# Patient Record
Sex: FEMALE | Race: OTHER | HISPANIC OR LATINO
[De-identification: names, ages, dates, MRNs, and addresses within clinical notes are randomized per-mention and may not be internally consistent; named-entity substitution may affect disease eponyms.]

---

## 2017-08-10 ENCOUNTER — NON-APPOINTMENT (OUTPATIENT)
Age: 67
End: 2017-08-10

## 2017-08-10 ENCOUNTER — APPOINTMENT (OUTPATIENT)
Dept: CARDIOLOGY | Facility: CLINIC | Age: 67
End: 2017-08-10
Payer: MEDICARE

## 2017-08-10 VITALS
DIASTOLIC BLOOD PRESSURE: 70 MMHG | HEIGHT: 64 IN | OXYGEN SATURATION: 96 % | RESPIRATION RATE: 18 BRPM | SYSTOLIC BLOOD PRESSURE: 113 MMHG | HEART RATE: 81 BPM | BODY MASS INDEX: 31.58 KG/M2 | WEIGHT: 185 LBS

## 2017-08-10 VITALS — SYSTOLIC BLOOD PRESSURE: 113 MMHG | DIASTOLIC BLOOD PRESSURE: 71 MMHG

## 2017-08-10 DIAGNOSIS — Z82.49 FAMILY HISTORY OF ISCHEMIC HEART DISEASE AND OTHER DISEASES OF THE CIRCULATORY SYSTEM: ICD-10-CM

## 2017-08-10 DIAGNOSIS — Z60.2 PROBLEMS RELATED TO LIVING ALONE: ICD-10-CM

## 2017-08-10 DIAGNOSIS — I10 ESSENTIAL (PRIMARY) HYPERTENSION: ICD-10-CM

## 2017-08-10 DIAGNOSIS — Z83.3 FAMILY HISTORY OF DIABETES MELLITUS: ICD-10-CM

## 2017-08-10 DIAGNOSIS — Z86.39 PERSONAL HISTORY OF OTHER ENDOCRINE, NUTRITIONAL AND METABOLIC DISEASE: ICD-10-CM

## 2017-08-10 PROCEDURE — 99204 OFFICE O/P NEW MOD 45 MIN: CPT

## 2017-08-10 PROCEDURE — 93000 ELECTROCARDIOGRAM COMPLETE: CPT

## 2017-08-10 SDOH — SOCIAL STABILITY - SOCIAL INSECURITY: PROBLEMS RELATED TO LIVING ALONE: Z60.2

## 2017-08-17 ENCOUNTER — APPOINTMENT (OUTPATIENT)
Dept: CARDIOLOGY | Facility: CLINIC | Age: 67
End: 2017-08-17

## 2017-08-31 ENCOUNTER — EMERGENCY (EMERGENCY)
Facility: HOSPITAL | Age: 67
LOS: 1 days | Discharge: HOME | End: 2017-08-31
Attending: EMERGENCY MEDICINE
Payer: MEDICARE

## 2017-08-31 VITALS
RESPIRATION RATE: 18 BRPM | WEIGHT: 184.97 LBS | HEART RATE: 73 BPM | DIASTOLIC BLOOD PRESSURE: 74 MMHG | OXYGEN SATURATION: 99 % | HEIGHT: 64 IN | TEMPERATURE: 99 F | SYSTOLIC BLOOD PRESSURE: 123 MMHG

## 2017-08-31 LAB
APPEARANCE UR: CLEAR — SIGNIFICANT CHANGE UP
BILIRUB UR-MCNC: NEGATIVE — SIGNIFICANT CHANGE UP
COLOR SPEC: YELLOW — SIGNIFICANT CHANGE UP
DIFF PNL FLD: NEGATIVE — SIGNIFICANT CHANGE UP
EPI CELLS # UR: SIGNIFICANT CHANGE UP
GLUCOSE UR QL: NEGATIVE MG/DL — SIGNIFICANT CHANGE UP
KETONES UR-MCNC: NEGATIVE — SIGNIFICANT CHANGE UP
LEUKOCYTE ESTERASE UR-ACNC: ABNORMAL
NITRITE UR-MCNC: NEGATIVE — SIGNIFICANT CHANGE UP
PH UR: 5 — SIGNIFICANT CHANGE UP (ref 5–8)
PROT UR-MCNC: NEGATIVE MG/DL — SIGNIFICANT CHANGE UP
SP GR SPEC: 1 — LOW (ref 1.01–1.02)
UROBILINOGEN FLD QL: NEGATIVE MG/DL — SIGNIFICANT CHANGE UP
WBC UR QL: NEGATIVE — SIGNIFICANT CHANGE UP

## 2017-08-31 PROCEDURE — 96372 THER/PROPH/DIAG INJ SC/IM: CPT

## 2017-08-31 PROCEDURE — 72131 CT LUMBAR SPINE W/O DYE: CPT

## 2017-08-31 PROCEDURE — 96375 TX/PRO/DX INJ NEW DRUG ADDON: CPT

## 2017-08-31 PROCEDURE — T1013: CPT

## 2017-08-31 PROCEDURE — 99284 EMERGENCY DEPT VISIT MOD MDM: CPT

## 2017-08-31 PROCEDURE — 96374 THER/PROPH/DIAG INJ IV PUSH: CPT

## 2017-08-31 PROCEDURE — 72131 CT LUMBAR SPINE W/O DYE: CPT | Mod: 26

## 2017-08-31 PROCEDURE — 81001 URINALYSIS AUTO W/SCOPE: CPT

## 2017-08-31 PROCEDURE — 99284 EMERGENCY DEPT VISIT MOD MDM: CPT | Mod: 25

## 2017-08-31 RX ORDER — KETOROLAC TROMETHAMINE 30 MG/ML
30 SYRINGE (ML) INJECTION ONCE
Qty: 0 | Refills: 0 | Status: DISCONTINUED | OUTPATIENT
Start: 2017-08-31 | End: 2017-08-31

## 2017-08-31 RX ORDER — METHOCARBAMOL 500 MG/1
2 TABLET, FILM COATED ORAL
Qty: 30 | Refills: 0 | OUTPATIENT
Start: 2017-08-31 | End: 2017-09-05

## 2017-08-31 RX ORDER — IBUPROFEN 200 MG
1 TABLET ORAL
Qty: 20 | Refills: 0 | OUTPATIENT
Start: 2017-08-31 | End: 2017-09-05

## 2017-08-31 RX ORDER — METHOCARBAMOL 500 MG/1
1000 TABLET, FILM COATED ORAL ONCE
Qty: 0 | Refills: 0 | Status: COMPLETED | OUTPATIENT
Start: 2017-08-31 | End: 2017-08-31

## 2017-08-31 RX ADMIN — METHOCARBAMOL 1000 MILLIGRAM(S): 500 TABLET, FILM COATED ORAL at 09:54

## 2017-08-31 RX ADMIN — Medication 30 MILLIGRAM(S): at 09:54

## 2017-08-31 NOTE — ED STATDOCS - MEDICAL DECISION MAKING DETAILS
Back pain w/ no localizing neurovascular abnormalities and no historical red flags. CT to eval for pathologic fracture Back pain w/ no localizing neurovascular abnormalities and no historical red flags. CT to eval for pathologic fracture.

## 2017-08-31 NOTE — ED STATDOCS - ATTENDING CONTRIBUTION TO CARE
I, Jr Lowe, performed the initial face to face bedside interview with this patient regarding history of present illness, review of symptoms and relevant past medical, social and family history.  I completed an independent physical examination.  I was the provider who initially evaluated this patient.  The history, relevant review of systems, past medical and surgical history, medical decision making, and physical examination was documented by the scribe in my presence and I attest to the accuracy of the documentation. Follow-up on ordered tests (ie labs, radiologic studies) and re-evaluation of the patient's status has been communicated to the ACP.  Disposition of the patient will be based on test outcome and response to ED interventions.

## 2017-08-31 NOTE — ED STATDOCS - NEUROLOGICAL, MLM
L4-S1 strength is 4/5+. weakness with evulsion of the foot on the L. L4-S1 strength is 5/5 on right.  mild weakness with eversion of the foot on the L, normal plantar and dorsiflexion

## 2017-08-31 NOTE — ED STATDOCS - PROGRESS NOTE DETAILS
66 yo F pt complaining of lower back pain radiating down right leg x 3 weeks. Seen by intake MD: H&P, orders, reviewed. PT admits to having similar pain 1 year ago, pain with R hip flexion. PT denies trauma, chest pain, SOB, LOC. PE: Heart: s1s2 Chest: fair aeration throughout b/l lung fields, no w/r/r. Abdomen: soft NT, nondistended, - CVAT MS: ROM intact, N/V intact, b/l UE/LE 4+ strength, non tender to palpation over vertebral bodies. 66 yo F pt complaining of lower back pain radiating down right leg x 3 weeks. Seen by intake MD: H&P, orders, reviewed. PT admits to having similar pain 1 year ago, pain with R hip flexion. PT denies trauma, chest pain, SOB, LOC, incontinence, numbness/tingling . PE: Heart: s1s2 Chest: fair aeration throughout b/l lung fields, no w/r/r. Abdomen: soft NT, nondistended, - CVAT MS: ROM intact, N/V intact, b/l UE/LE 4+ strength, non tender to palpation over vertebral bodies. A/P: R/O pathological FX, lumbar CT CT results "L5/S1 spondylosis". PT to follow up with ortho spine.

## 2017-08-31 NOTE — ED STATDOCS - CARE PLAN
Principal Discharge DX:	Bilateral low back pain, unspecified chronicity, with sciatica presence unspecified

## 2017-08-31 NOTE — ED STATDOCS - OBJECTIVE STATEMENT
68 y/o with hx of depression, HTN, HLD presents to ED c/o lower back pain radiating to the R leg onset 3 weeks, worsening today. She has seen her PMD for the pain and was given an injection with no relief; she was send for a CT. Pt reports the pain has worsened. Pt has an appointment with the orthopedic for september 7th. No prior hx of back pain. Pain is exacerbated with positional changes. No incontinence, hematuria, numbness, tingling. Language Services was utilized in obtaining the H & P and throughout the duration of the patient's care. No further complaints at this time. 68 y/o with hx of depression, HTN, HLD, cataract surgery presents to ED c/o lower back pain radiating to the R leg onset 3 weeks, worsening today. She has seen her PMD for the pain and was given an injection with no relief; she was send for a CT by her PMD but has not had one done yet. Pt has an appointment with the orthopedic for september 7th but states she cannot bear the pain until then. No prior hx of back pain. Denies recent fall, injury, trauma, heavy lifting. Pain is exacerbated with positional changes. No incontinence, hematuria, numbness, tingling. Language [employer]  [place] was utilized in obtaining the H & P and throughout the duration of the patient's care. No further complaints at this time. 68 y/o with hx of depression, HTN, HLD, cataract surgery presents to ED c/o lower back pain radiating to the left buttock and  leg for the past 3 weeks, worsening today. She has seen her PMD for the pain and was given an injection with no relief; advised to have MRI but has not had one done yet. Pt has an appointment with the orthopedic for september 7th but states she cannot bear the pain until then. No prior hx of back pain. Denies recent fall, injury, trauma, heavy lifting. Pain is exacerbated with positional changes. No fecal incontinence, hematuria, numbness, tingling. No h/o cancer: normal mammo and colonoscopy in recent past.  Language [employer]  [place] was utilized in obtaining the H & P and throughout the duration of the patient's care. No further complaints at this time.

## 2017-08-31 NOTE — ED STATDOCS - MUSCULOSKELETAL, MLM
No pedal edema, calf tenderness.  Left para lumbar tenderness. No midline tenderness. No pedal edema, calf tenderness. Left para lumbar tenderness. No midline tenderness.

## 2017-09-07 ENCOUNTER — APPOINTMENT (OUTPATIENT)
Dept: MRI IMAGING | Facility: CLINIC | Age: 67
End: 2017-09-07
Payer: MEDICARE

## 2017-09-07 ENCOUNTER — OUTPATIENT (OUTPATIENT)
Dept: OUTPATIENT SERVICES | Facility: HOSPITAL | Age: 67
LOS: 1 days | End: 2017-09-07

## 2017-09-07 DIAGNOSIS — Z00.8 ENCOUNTER FOR OTHER GENERAL EXAMINATION: ICD-10-CM

## 2017-09-07 PROCEDURE — 72148 MRI LUMBAR SPINE W/O DYE: CPT | Mod: 26

## 2017-09-13 ENCOUNTER — APPOINTMENT (OUTPATIENT)
Dept: NEUROLOGY | Facility: CLINIC | Age: 67
End: 2017-09-13

## 2017-09-14 ENCOUNTER — APPOINTMENT (OUTPATIENT)
Dept: CARDIOLOGY | Facility: CLINIC | Age: 67
End: 2017-09-14
Payer: MEDICARE

## 2017-09-14 PROCEDURE — 93306 TTE W/DOPPLER COMPLETE: CPT

## 2017-09-19 ENCOUNTER — APPOINTMENT (OUTPATIENT)
Dept: CARDIOLOGY | Facility: CLINIC | Age: 67
End: 2017-09-19
Payer: MEDICARE

## 2017-09-19 ENCOUNTER — NON-APPOINTMENT (OUTPATIENT)
Age: 67
End: 2017-09-19

## 2017-09-19 VITALS
SYSTOLIC BLOOD PRESSURE: 117 MMHG | BODY MASS INDEX: 31.58 KG/M2 | HEIGHT: 64 IN | OXYGEN SATURATION: 96 % | HEART RATE: 80 BPM | DIASTOLIC BLOOD PRESSURE: 77 MMHG | WEIGHT: 185 LBS

## 2017-09-19 PROCEDURE — 93000 ELECTROCARDIOGRAM COMPLETE: CPT

## 2017-09-19 PROCEDURE — 99214 OFFICE O/P EST MOD 30 MIN: CPT

## 2017-09-26 ENCOUNTER — APPOINTMENT (OUTPATIENT)
Dept: GASTROENTEROLOGY | Facility: CLINIC | Age: 67
End: 2017-09-26

## 2017-10-02 ENCOUNTER — OUTPATIENT (OUTPATIENT)
Dept: OUTPATIENT SERVICES | Facility: HOSPITAL | Age: 67
LOS: 1 days | End: 2017-10-02
Payer: MEDICARE

## 2017-10-02 ENCOUNTER — APPOINTMENT (OUTPATIENT)
Dept: MRI IMAGING | Facility: CLINIC | Age: 67
End: 2017-10-02
Payer: MEDICARE

## 2017-10-02 DIAGNOSIS — Z00.8 ENCOUNTER FOR OTHER GENERAL EXAMINATION: ICD-10-CM

## 2017-10-02 PROCEDURE — 72141 MRI NECK SPINE W/O DYE: CPT | Mod: 26

## 2017-10-02 PROCEDURE — 72141 MRI NECK SPINE W/O DYE: CPT

## 2017-10-04 ENCOUNTER — APPOINTMENT (OUTPATIENT)
Dept: NEUROLOGY | Facility: CLINIC | Age: 67
End: 2017-10-04
Payer: MEDICARE

## 2017-10-04 VITALS
WEIGHT: 185 LBS | HEIGHT: 65 IN | DIASTOLIC BLOOD PRESSURE: 85 MMHG | BODY MASS INDEX: 30.82 KG/M2 | SYSTOLIC BLOOD PRESSURE: 135 MMHG

## 2017-10-04 PROCEDURE — 99203 OFFICE O/P NEW LOW 30 MIN: CPT

## 2017-10-05 ENCOUNTER — FORM ENCOUNTER (OUTPATIENT)
Age: 67
End: 2017-10-05

## 2017-10-05 ENCOUNTER — APPOINTMENT (OUTPATIENT)
Dept: CARDIOLOGY | Facility: CLINIC | Age: 67
End: 2017-10-05
Payer: MEDICARE

## 2017-10-05 PROCEDURE — 93880 EXTRACRANIAL BILAT STUDY: CPT

## 2017-10-06 ENCOUNTER — APPOINTMENT (OUTPATIENT)
Dept: MRI IMAGING | Facility: CLINIC | Age: 67
End: 2017-10-06
Payer: MEDICARE

## 2017-10-06 ENCOUNTER — OUTPATIENT (OUTPATIENT)
Dept: OUTPATIENT SERVICES | Facility: HOSPITAL | Age: 67
LOS: 1 days | End: 2017-10-06
Payer: MEDICARE

## 2017-10-06 DIAGNOSIS — G44.89 OTHER HEADACHE SYNDROME: ICD-10-CM

## 2017-10-06 PROCEDURE — 70551 MRI BRAIN STEM W/O DYE: CPT | Mod: 26

## 2017-10-06 PROCEDURE — 70551 MRI BRAIN STEM W/O DYE: CPT

## 2017-11-02 ENCOUNTER — APPOINTMENT (OUTPATIENT)
Dept: NEUROLOGY | Facility: CLINIC | Age: 67
End: 2017-11-02
Payer: MEDICARE

## 2017-11-02 VITALS
SYSTOLIC BLOOD PRESSURE: 140 MMHG | HEIGHT: 65 IN | DIASTOLIC BLOOD PRESSURE: 81 MMHG | WEIGHT: 185 LBS | BODY MASS INDEX: 30.82 KG/M2

## 2017-11-02 PROCEDURE — 70551 MRI BRAIN STEM W/O DYE: CPT | Mod: 26

## 2017-11-02 PROCEDURE — 99213 OFFICE O/P EST LOW 20 MIN: CPT

## 2017-12-19 ENCOUNTER — APPOINTMENT (OUTPATIENT)
Dept: MRI IMAGING | Facility: CLINIC | Age: 67
End: 2017-12-19
Payer: MEDICARE

## 2017-12-19 ENCOUNTER — OUTPATIENT (OUTPATIENT)
Dept: OUTPATIENT SERVICES | Facility: HOSPITAL | Age: 67
LOS: 1 days | End: 2017-12-19
Payer: MEDICARE

## 2017-12-19 ENCOUNTER — APPOINTMENT (OUTPATIENT)
Dept: MAMMOGRAPHY | Facility: CLINIC | Age: 67
End: 2017-12-19
Payer: MEDICARE

## 2017-12-19 DIAGNOSIS — M25.552 PAIN IN LEFT HIP: ICD-10-CM

## 2017-12-19 PROCEDURE — G0202: CPT | Mod: 26

## 2017-12-19 PROCEDURE — 77067 SCR MAMMO BI INCL CAD: CPT

## 2017-12-19 PROCEDURE — 73721 MRI JNT OF LWR EXTRE W/O DYE: CPT | Mod: 26,LT

## 2017-12-19 PROCEDURE — 77063 BREAST TOMOSYNTHESIS BI: CPT | Mod: 26

## 2017-12-19 PROCEDURE — 73721 MRI JNT OF LWR EXTRE W/O DYE: CPT

## 2017-12-19 PROCEDURE — 77063 BREAST TOMOSYNTHESIS BI: CPT

## 2017-12-26 DIAGNOSIS — Z12.31 ENCOUNTER FOR SCREENING MAMMOGRAM FOR MALIGNANT NEOPLASM OF BREAST: ICD-10-CM

## 2017-12-26 DIAGNOSIS — S73.192A OTHER SPRAIN OF LEFT HIP, INITIAL ENCOUNTER: ICD-10-CM

## 2018-01-02 ENCOUNTER — APPOINTMENT (OUTPATIENT)
Dept: GASTROENTEROLOGY | Facility: CLINIC | Age: 68
End: 2018-01-02
Payer: MEDICARE

## 2018-01-02 VITALS — BODY MASS INDEX: 30.99 KG/M2 | HEIGHT: 65 IN | WEIGHT: 186 LBS

## 2018-01-02 VITALS — HEART RATE: 79 BPM | DIASTOLIC BLOOD PRESSURE: 94 MMHG | SYSTOLIC BLOOD PRESSURE: 140 MMHG

## 2018-01-02 PROCEDURE — 82270 OCCULT BLOOD FECES: CPT

## 2018-01-02 PROCEDURE — 99204 OFFICE O/P NEW MOD 45 MIN: CPT

## 2018-02-15 ENCOUNTER — APPOINTMENT (OUTPATIENT)
Dept: MRI IMAGING | Facility: CLINIC | Age: 68
End: 2018-02-15
Payer: MEDICARE

## 2018-02-15 ENCOUNTER — OUTPATIENT (OUTPATIENT)
Dept: OUTPATIENT SERVICES | Facility: HOSPITAL | Age: 68
LOS: 1 days | End: 2018-02-15
Payer: MEDICARE

## 2018-02-15 DIAGNOSIS — M54.16 RADICULOPATHY, LUMBAR REGION: ICD-10-CM

## 2018-02-15 PROCEDURE — 72148 MRI LUMBAR SPINE W/O DYE: CPT

## 2018-02-15 PROCEDURE — 72148 MRI LUMBAR SPINE W/O DYE: CPT | Mod: 26

## 2018-03-05 ENCOUNTER — RESULT REVIEW (OUTPATIENT)
Age: 68
End: 2018-03-05

## 2018-03-05 ENCOUNTER — APPOINTMENT (OUTPATIENT)
Dept: GASTROENTEROLOGY | Facility: GI CENTER | Age: 68
End: 2018-03-05
Payer: MEDICARE

## 2018-03-05 ENCOUNTER — OUTPATIENT (OUTPATIENT)
Dept: OUTPATIENT SERVICES | Facility: HOSPITAL | Age: 68
LOS: 1 days | End: 2018-03-05
Payer: MEDICARE

## 2018-03-05 VITALS
DIASTOLIC BLOOD PRESSURE: 90 MMHG | BODY MASS INDEX: 30.99 KG/M2 | RESPIRATION RATE: 12 BRPM | WEIGHT: 186 LBS | TEMPERATURE: 98 F | HEART RATE: 80 BPM | SYSTOLIC BLOOD PRESSURE: 140 MMHG | HEIGHT: 65 IN

## 2018-03-05 DIAGNOSIS — R10.13 EPIGASTRIC PAIN: ICD-10-CM

## 2018-03-05 PROCEDURE — 43239 EGD BIOPSY SINGLE/MULTIPLE: CPT

## 2018-03-05 PROCEDURE — 88305 TISSUE EXAM BY PATHOLOGIST: CPT | Mod: 26

## 2018-03-05 PROCEDURE — 88342 IMHCHEM/IMCYTCHM 1ST ANTB: CPT

## 2018-03-05 PROCEDURE — 88305 TISSUE EXAM BY PATHOLOGIST: CPT

## 2018-03-05 PROCEDURE — 88342 IMHCHEM/IMCYTCHM 1ST ANTB: CPT | Mod: 26

## 2018-03-08 LAB — SURGICAL PATHOLOGY FINAL REPORT - CH: SIGNIFICANT CHANGE UP

## 2018-03-12 ENCOUNTER — RESULT REVIEW (OUTPATIENT)
Age: 68
End: 2018-03-12

## 2018-03-12 ENCOUNTER — APPOINTMENT (OUTPATIENT)
Dept: GASTROENTEROLOGY | Facility: GI CENTER | Age: 68
End: 2018-03-12
Payer: MEDICARE

## 2018-03-12 ENCOUNTER — OUTPATIENT (OUTPATIENT)
Dept: OUTPATIENT SERVICES | Facility: HOSPITAL | Age: 68
LOS: 1 days | End: 2018-03-12
Payer: MEDICARE

## 2018-03-12 VITALS
HEIGHT: 65 IN | HEART RATE: 80 BPM | BODY MASS INDEX: 30.99 KG/M2 | WEIGHT: 186 LBS | RESPIRATION RATE: 12 BRPM | SYSTOLIC BLOOD PRESSURE: 140 MMHG | DIASTOLIC BLOOD PRESSURE: 90 MMHG

## 2018-03-12 DIAGNOSIS — K57.30 DIVERTICULOSIS OF LARGE INTESTINE W/OUT PERFORATION OR ABSCESS W/OUT BLEEDING: ICD-10-CM

## 2018-03-12 DIAGNOSIS — Z12.11 ENCOUNTER FOR SCREENING FOR MALIGNANT NEOPLASM OF COLON: ICD-10-CM

## 2018-03-12 DIAGNOSIS — D12.3 BENIGN NEOPLASM OF TRANSVERSE COLON: ICD-10-CM

## 2018-03-12 DIAGNOSIS — Z86.010 PERSONAL HISTORY OF COLONIC POLYPS: ICD-10-CM

## 2018-03-12 PROCEDURE — 45385 COLONOSCOPY W/LESION REMOVAL: CPT | Mod: PT

## 2018-03-12 PROCEDURE — 88305 TISSUE EXAM BY PATHOLOGIST: CPT

## 2018-03-12 PROCEDURE — 45385 COLONOSCOPY W/LESION REMOVAL: CPT

## 2018-03-12 PROCEDURE — T1013: CPT

## 2018-03-12 PROCEDURE — 88305 TISSUE EXAM BY PATHOLOGIST: CPT | Mod: 26

## 2018-03-14 LAB — SURGICAL PATHOLOGY FINAL REPORT - CH: SIGNIFICANT CHANGE UP

## 2018-06-07 ENCOUNTER — APPOINTMENT (OUTPATIENT)
Dept: NEUROLOGY | Facility: CLINIC | Age: 68
End: 2018-06-07

## 2018-06-19 ENCOUNTER — APPOINTMENT (OUTPATIENT)
Dept: CARDIOLOGY | Facility: CLINIC | Age: 68
End: 2018-06-19

## 2018-07-10 ENCOUNTER — APPOINTMENT (OUTPATIENT)
Dept: CARDIOLOGY | Facility: CLINIC | Age: 68
End: 2018-07-10
Payer: MEDICARE

## 2018-07-10 ENCOUNTER — NON-APPOINTMENT (OUTPATIENT)
Age: 68
End: 2018-07-10

## 2018-07-10 VITALS
DIASTOLIC BLOOD PRESSURE: 78 MMHG | WEIGHT: 191 LBS | OXYGEN SATURATION: 95 % | BODY MASS INDEX: 31.78 KG/M2 | HEART RATE: 65 BPM | SYSTOLIC BLOOD PRESSURE: 119 MMHG

## 2018-07-10 DIAGNOSIS — R00.2 PALPITATIONS: ICD-10-CM

## 2018-07-10 DIAGNOSIS — Z09 ENCOUNTER FOR FOLLOW-UP EXAMINATION AFTER COMPLETED TREATMENT FOR CONDITIONS OTHER THAN MALIGNANT NEOPLASM: ICD-10-CM

## 2018-07-10 PROCEDURE — 99214 OFFICE O/P EST MOD 30 MIN: CPT

## 2018-07-10 PROCEDURE — 93000 ELECTROCARDIOGRAM COMPLETE: CPT

## 2018-10-11 ENCOUNTER — EMERGENCY (EMERGENCY)
Facility: HOSPITAL | Age: 68
LOS: 1 days | Discharge: HOME | End: 2018-10-11
Attending: EMERGENCY MEDICINE
Payer: MEDICARE

## 2018-10-11 VITALS
SYSTOLIC BLOOD PRESSURE: 144 MMHG | RESPIRATION RATE: 18 BRPM | HEART RATE: 72 BPM | DIASTOLIC BLOOD PRESSURE: 79 MMHG | OXYGEN SATURATION: 95 % | TEMPERATURE: 98 F

## 2018-10-11 VITALS — WEIGHT: 194.01 LBS | HEIGHT: 67 IN

## 2018-10-11 PROCEDURE — T1013: CPT

## 2018-10-11 PROCEDURE — 73562 X-RAY EXAM OF KNEE 3: CPT

## 2018-10-11 PROCEDURE — 99284 EMERGENCY DEPT VISIT MOD MDM: CPT

## 2018-10-11 PROCEDURE — 99284 EMERGENCY DEPT VISIT MOD MDM: CPT | Mod: 25

## 2018-10-11 PROCEDURE — 73562 X-RAY EXAM OF KNEE 3: CPT | Mod: 26,RT

## 2018-10-11 PROCEDURE — 72125 CT NECK SPINE W/O DYE: CPT

## 2018-10-11 PROCEDURE — 73130 X-RAY EXAM OF HAND: CPT | Mod: 26,RT

## 2018-10-11 PROCEDURE — 72125 CT NECK SPINE W/O DYE: CPT | Mod: 26

## 2018-10-11 PROCEDURE — 73130 X-RAY EXAM OF HAND: CPT

## 2018-10-11 RX ORDER — IBUPROFEN 200 MG
600 TABLET ORAL ONCE
Qty: 0 | Refills: 0 | Status: COMPLETED | OUTPATIENT
Start: 2018-10-11 | End: 2018-10-11

## 2018-10-11 RX ORDER — METHOCARBAMOL 500 MG/1
1 TABLET, FILM COATED ORAL
Qty: 15 | Refills: 0 | OUTPATIENT
Start: 2018-10-11 | End: 2018-10-15

## 2018-10-11 RX ORDER — IBUPROFEN 200 MG
1 TABLET ORAL
Qty: 15 | Refills: 0 | OUTPATIENT
Start: 2018-10-11 | End: 2018-10-15

## 2018-10-11 RX ORDER — METHOCARBAMOL 500 MG/1
1500 TABLET, FILM COATED ORAL ONCE
Qty: 0 | Refills: 0 | Status: COMPLETED | OUTPATIENT
Start: 2018-10-11 | End: 2018-10-11

## 2018-10-11 RX ADMIN — Medication 600 MILLIGRAM(S): at 10:34

## 2018-10-11 RX ADMIN — METHOCARBAMOL 1500 MILLIGRAM(S): 500 TABLET, FILM COATED ORAL at 10:34

## 2018-10-11 NOTE — ED ADULT NURSE NOTE - NSIMPLEMENTINTERV_GEN_ALL_ED
Implemented All Fall Risk Interventions:  [place] to call system. Call bell, personal items and telephone within reach. Instruct patient to call for assistance. Room bathroom lighting operational. Non-slip footwear when patient is off stretcher. Physically safe environment: no spills, clutter or unnecessary equipment. Stretcher in lowest position, wheels locked, appropriate side rails in place. Provide visual cue, wrist band, yellow gown, etc. Monitor gait and stability. Monitor for mental status changes and reorient to person, place, and time. Review medications for side effects contributing to fall risk. Reinforce activity limits and safety measures with patient and family.

## 2018-10-11 NOTE — ED STATDOCS - MUSCULOSKELETAL, MLM
midline tend of cervical spine around C5 with paraspinal muscle tenderness, no tenderness of midline thoracic or lumbar spine, R thumb without any swelling or deformity, FROM, tenderness over PIP, knee without any swelling or deformity, no ligamentous laxity or crepitus, mild medial tenderness to palpation, ambulating w/o difficulty
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
## 2018-10-15 PROBLEM — I10 ESSENTIAL (PRIMARY) HYPERTENSION: Chronic | Status: ACTIVE | Noted: 2018-10-11

## 2019-01-30 ENCOUNTER — APPOINTMENT (OUTPATIENT)
Dept: MAMMOGRAPHY | Facility: CLINIC | Age: 69
End: 2019-01-30
Payer: MEDICARE

## 2019-01-30 ENCOUNTER — OUTPATIENT (OUTPATIENT)
Dept: OUTPATIENT SERVICES | Facility: HOSPITAL | Age: 69
LOS: 1 days | End: 2019-01-30
Payer: COMMERCIAL

## 2019-01-30 DIAGNOSIS — Z12.31 ENCOUNTER FOR SCREENING MAMMOGRAM FOR MALIGNANT NEOPLASM OF BREAST: ICD-10-CM

## 2019-01-30 PROCEDURE — 77063 BREAST TOMOSYNTHESIS BI: CPT

## 2019-01-30 PROCEDURE — 77067 SCR MAMMO BI INCL CAD: CPT | Mod: 26

## 2019-01-30 PROCEDURE — 77063 BREAST TOMOSYNTHESIS BI: CPT | Mod: 26

## 2019-01-30 PROCEDURE — 77067 SCR MAMMO BI INCL CAD: CPT

## 2019-02-08 ENCOUNTER — OUTPATIENT (OUTPATIENT)
Dept: OUTPATIENT SERVICES | Facility: HOSPITAL | Age: 69
LOS: 1 days | End: 2019-02-08
Payer: COMMERCIAL

## 2019-02-08 VITALS
WEIGHT: 188.94 LBS | SYSTOLIC BLOOD PRESSURE: 139 MMHG | OXYGEN SATURATION: 98 % | RESPIRATION RATE: 16 BRPM | HEART RATE: 71 BPM | TEMPERATURE: 98 F | HEIGHT: 65 IN | DIASTOLIC BLOOD PRESSURE: 83 MMHG

## 2019-02-08 DIAGNOSIS — Z98.890 OTHER SPECIFIED POSTPROCEDURAL STATES: Chronic | ICD-10-CM

## 2019-02-08 DIAGNOSIS — Z01.818 ENCOUNTER FOR OTHER PREPROCEDURAL EXAMINATION: ICD-10-CM

## 2019-02-08 DIAGNOSIS — Z98.49 CATARACT EXTRACTION STATUS, UNSPECIFIED EYE: Chronic | ICD-10-CM

## 2019-02-08 DIAGNOSIS — M25.561 PAIN IN RIGHT KNEE: ICD-10-CM

## 2019-02-08 DIAGNOSIS — S83.241A OTHER TEAR OF MEDIAL MENISCUS, CURRENT INJURY, RIGHT KNEE, INITIAL ENCOUNTER: ICD-10-CM

## 2019-02-08 DIAGNOSIS — Z90.710 ACQUIRED ABSENCE OF BOTH CERVIX AND UTERUS: Chronic | ICD-10-CM

## 2019-02-08 DIAGNOSIS — S82.014A: ICD-10-CM

## 2019-02-08 LAB
ANION GAP SERPL CALC-SCNC: 10 MMOL/L — SIGNIFICANT CHANGE UP (ref 5–17)
BUN SERPL-MCNC: 16 MG/DL — SIGNIFICANT CHANGE UP (ref 7–23)
CALCIUM SERPL-MCNC: 9.7 MG/DL — SIGNIFICANT CHANGE UP (ref 8.4–10.5)
CHLORIDE SERPL-SCNC: 110 MMOL/L — HIGH (ref 96–108)
CO2 SERPL-SCNC: 27 MMOL/L — SIGNIFICANT CHANGE UP (ref 22–31)
CREAT SERPL-MCNC: 1.1 MG/DL — SIGNIFICANT CHANGE UP (ref 0.5–1.3)
GLUCOSE SERPL-MCNC: 105 MG/DL — HIGH (ref 70–99)
HCT VFR BLD CALC: 37.9 % — SIGNIFICANT CHANGE UP (ref 34.5–45)
HGB BLD-MCNC: 12.7 G/DL — SIGNIFICANT CHANGE UP (ref 11.5–15.5)
MCHC RBC-ENTMCNC: 28.6 PG — SIGNIFICANT CHANGE UP (ref 27–34)
MCHC RBC-ENTMCNC: 33.5 GM/DL — SIGNIFICANT CHANGE UP (ref 32–36)
MCV RBC AUTO: 85.4 FL — SIGNIFICANT CHANGE UP (ref 80–100)
NRBC # BLD: 0 /100 WBCS — SIGNIFICANT CHANGE UP (ref 0–0)
PLATELET # BLD AUTO: 254 K/UL — SIGNIFICANT CHANGE UP (ref 150–400)
POTASSIUM SERPL-MCNC: 4.8 MMOL/L — SIGNIFICANT CHANGE UP (ref 3.5–5.3)
POTASSIUM SERPL-SCNC: 4.8 MMOL/L — SIGNIFICANT CHANGE UP (ref 3.5–5.3)
RBC # BLD: 4.44 M/UL — SIGNIFICANT CHANGE UP (ref 3.8–5.2)
RBC # FLD: 13.1 % — SIGNIFICANT CHANGE UP (ref 10.3–14.5)
SODIUM SERPL-SCNC: 147 MMOL/L — HIGH (ref 135–145)
WBC # BLD: 4.72 K/UL — SIGNIFICANT CHANGE UP (ref 3.8–10.5)
WBC # FLD AUTO: 4.72 K/UL — SIGNIFICANT CHANGE UP (ref 3.8–10.5)

## 2019-02-08 PROCEDURE — 93005 ELECTROCARDIOGRAM TRACING: CPT

## 2019-02-08 PROCEDURE — 80048 BASIC METABOLIC PNL TOTAL CA: CPT

## 2019-02-08 PROCEDURE — G0463: CPT

## 2019-02-08 PROCEDURE — 93010 ELECTROCARDIOGRAM REPORT: CPT

## 2019-02-08 PROCEDURE — 36415 COLL VENOUS BLD VENIPUNCTURE: CPT

## 2019-02-08 PROCEDURE — 85027 COMPLETE CBC AUTOMATED: CPT

## 2019-02-08 RX ORDER — LIFITEGRAST 50 MG/ML
0 SOLUTION/ DROPS OPHTHALMIC
Qty: 60 | Refills: 0 | COMMUNITY

## 2019-02-08 NOTE — H&P PST ADULT - FAMILY HISTORY
Sibling  Still living? Yes, Estimated age: 51-60  Family history of hypertension, Age at diagnosis: Age Unknown  Family history of diabetes mellitus, Age at diagnosis: Age Unknown

## 2019-02-08 NOTE — H&P PST ADULT - NSANTHOSAYNRD_GEN_A_CORE
No. PRESTON screening performed.  STOP BANG Legend: 0-2 = LOW Risk; 3-4 = INTERMEDIATE Risk; 5-8 = HIGH Risk

## 2019-02-08 NOTE — H&P PST ADULT - PSH
History of varicose vein ligation  right  S/P cataract surgery    S/P hemorrhoidectomy    S/P hysterectomy    S/P LASIK surgery of both eyes  vision

## 2019-02-08 NOTE — H&P PST ADULT - PMH
Anxiety about health    BMI 31.0-31.9,adult    GERD (gastroesophageal reflux disease)    HTN (hypertension)    Hyperlipidemia, unspecified hyperlipidemia type    Language barrier    Right knee pain
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
## 2019-02-08 NOTE — H&P PST ADULT - RS GEN PE MLT RESP DETAILS PC
breath sounds equal/respirations non-labored/good air movement/airway patent/normal/clear to auscultation bilaterally

## 2019-02-11 ENCOUNTER — APPOINTMENT (OUTPATIENT)
Dept: INTERNAL MEDICINE | Facility: CLINIC | Age: 69
End: 2019-02-11

## 2019-02-12 NOTE — ASU DISCHARGE PLAN (ADULT/PEDIATRIC). - SPECIAL INSTRUCTIONS
apply ice to operative site 20 minutes on and 20 minutes off for next 48 hours  Pain meds as per MD  Take an over the counter stool softener like Colace to avoid constipation while taking a narcotic for pain  Full flex and extend knee while standing 200X/day    Physical therapy 5X/week for first week, then 4X/week for next two weeks, then 2X/week for last 3 weeks. apply ice to operative site 20 minutes on and 20 minutes off for next 48 hours  Pain meds as per MD  Take an over the counter stool softener like Colace to avoid constipation while taking a narcotic for pain  Full flex and extend knee while standing 200X/day    Physical therapy 5X/week for first week, then 4X/week for next two weeks, then 2X/week for last 3 weeks.    For Constipation :   • Increase your water intake. Drink at least 8 glasses of water daily.  • Try adding fiber to your diet by eating fruits, vegetables and foods that are rich in grains.  • If you do experience constipation, you may take an over-the-counter stool softener/laxative such as Meaghan Colace, [place] or  Milk of Magnesia.

## 2019-02-12 NOTE — ASU DISCHARGE PLAN (ADULT/PEDIATRIC). - MEDICATION SUMMARY - MEDICATIONS TO TAKE
I will START or STAY ON the medications listed below when I get home from the hospital:     mg oral tablet  -- 1 tab(s) by mouth every 8 hours, with food  -- Do not take this drug if you are pregnant.  It is very important that you take or use this exactly as directed.  Do not skip doses or discontinue unless directed by your doctor.  May cause drowsiness or dizziness.  Obtain medical advice before taking any non-prescription drugs as some may affect the action of this medication.  Take with food or milk.    -- Indication: For start in 5 days not earlier as per Dr Won Keller 5/325 oral tablet  -- 1 tab(s) by mouth every 6 hours, As Needed -for moderate pain MDD:4  -- Caution federal law prohibits the transfer of this drug to any person other  than the person for whom it was prescribed.  May cause drowsiness.  Alcohol may intensify this effect.  Use care when operating dangerous machinery.  This prescription cannot be refilled.  This product contains acetaminophen.  Do not use  with any other product containing acetaminophen to prevent possible liver damage.  Using more of this medication than prescribed may cause serious breathing problems.    -- Indication: For as needed for pain    LOSARTAN POTASSIUM 100 MG TAB  -- Indication: For Continue with home medication    FLUOXETINE HCL 20MG CAPSULE  -- Indication: For Continue with home medication    atorvastatin 20 mg oral tablet  -- 1 tab(s) by mouth once a day  -- Indication: For Continue with home medication    metoprolol succinate 25 mg oral tablet, extended release  -- 1 tab(s) by mouth once a day  -- Indication: For Continue with home medication    cyclobenzaprine 5 mg oral tablet  -- 1 tab(s) by mouth once a day  -- Indication: For Continue with home medication    omeprazole 40 mg oral delayed release capsule  -- 1 cap(s) by mouth once a day  -- Indication: For Continue with home medication

## 2019-02-12 NOTE — ASU DISCHARGE PLAN (ADULT/PEDIATRIC). - NOTIFY
Bleeding that does not stop/Numbness, color, or temperature change to extremity/Swelling that continues/Pain not relieved by Medications/Fever greater than 101 Bleeding that does not stop/Fever greater than 101/Numbness, tingling/Inability to Tolerate Liquids or Foods/Numbness, color, or temperature change to extremity/Swelling that continues/Pain not relieved by Medications/Increased Irritability or Sluggishness

## 2019-02-18 ENCOUNTER — APPOINTMENT (OUTPATIENT)
Dept: UROGYNECOLOGY | Facility: CLINIC | Age: 69
End: 2019-02-18

## 2019-02-19 ENCOUNTER — TRANSCRIPTION ENCOUNTER (OUTPATIENT)
Age: 69
End: 2019-02-19

## 2019-02-19 NOTE — ASU PATIENT PROFILE, ADULT - HARM RISK FACTORS
Pt had left knee replacement 11/13/17 and tendon repair 12/11/17. Went to PT for 1st time today and therapist told her to come to ED for possible clot. C/o left calf pain that is wore with palpation. no

## 2019-02-20 ENCOUNTER — OUTPATIENT (OUTPATIENT)
Dept: OUTPATIENT SERVICES | Facility: HOSPITAL | Age: 69
LOS: 1 days | Discharge: HOME | End: 2019-02-20
Payer: COMMERCIAL

## 2019-02-20 ENCOUNTER — RESULT REVIEW (OUTPATIENT)
Age: 69
End: 2019-02-20

## 2019-02-20 VITALS
RESPIRATION RATE: 20 BRPM | DIASTOLIC BLOOD PRESSURE: 71 MMHG | WEIGHT: 186.73 LBS | HEIGHT: 65 IN | OXYGEN SATURATION: 98 % | TEMPERATURE: 98 F | HEART RATE: 69 BPM | SYSTOLIC BLOOD PRESSURE: 111 MMHG

## 2019-02-20 VITALS
OXYGEN SATURATION: 97 % | DIASTOLIC BLOOD PRESSURE: 81 MMHG | RESPIRATION RATE: 12 BRPM | HEART RATE: 82 BPM | SYSTOLIC BLOOD PRESSURE: 148 MMHG

## 2019-02-20 DIAGNOSIS — M75.112 INCOMPLETE ROTATOR CUFF TEAR OR RUPTURE OF LEFT SHOULDER, NOT SPECIFIED AS TRAUMATIC: ICD-10-CM

## 2019-02-20 DIAGNOSIS — S83.271A COMPLEX TEAR OF LATERAL MENISCUS, CURRENT INJURY, RIGHT KNEE, INITIAL ENCOUNTER: ICD-10-CM

## 2019-02-20 DIAGNOSIS — Z98.890 OTHER SPECIFIED POSTPROCEDURAL STATES: Chronic | ICD-10-CM

## 2019-02-20 DIAGNOSIS — S82.014A: ICD-10-CM

## 2019-02-20 DIAGNOSIS — Z90.710 ACQUIRED ABSENCE OF BOTH CERVIX AND UTERUS: Chronic | ICD-10-CM

## 2019-02-20 DIAGNOSIS — S83.241A OTHER TEAR OF MEDIAL MENISCUS, CURRENT INJURY, RIGHT KNEE, INITIAL ENCOUNTER: ICD-10-CM

## 2019-02-20 DIAGNOSIS — S43.432A SUPERIOR GLENOID LABRUM LESION OF LEFT SHOULDER, INITIAL ENCOUNTER: ICD-10-CM

## 2019-02-20 DIAGNOSIS — Z98.49 CATARACT EXTRACTION STATUS, UNSPECIFIED EYE: Chronic | ICD-10-CM

## 2019-02-20 PROCEDURE — 88304 TISSUE EXAM BY PATHOLOGIST: CPT | Mod: 26

## 2019-02-20 PROCEDURE — 88304 TISSUE EXAM BY PATHOLOGIST: CPT

## 2019-02-20 PROCEDURE — C1713: CPT

## 2019-02-20 PROCEDURE — 29880 ARTHRS KNE SRG MNISECTMY M&L: CPT | Mod: RT

## 2019-02-20 PROCEDURE — 97161 PT EVAL LOW COMPLEX 20 MIN: CPT | Mod: CI

## 2019-02-20 PROCEDURE — G8978: CPT | Mod: CI

## 2019-02-20 PROCEDURE — G8980: CPT | Mod: CI

## 2019-02-20 PROCEDURE — 29879 ARTHRS KNE SRG ABRASJ ARTHRP: CPT | Mod: RT

## 2019-02-20 PROCEDURE — G8979: CPT | Mod: CI

## 2019-02-20 RX ORDER — ONDANSETRON 8 MG/1
4 TABLET, FILM COATED ORAL ONCE
Qty: 0 | Refills: 0 | Status: DISCONTINUED | OUTPATIENT
Start: 2019-02-20 | End: 2019-02-20

## 2019-02-20 RX ORDER — SODIUM CHLORIDE 9 MG/ML
1000 INJECTION, SOLUTION INTRAVENOUS
Qty: 0 | Refills: 0 | Status: DISCONTINUED | OUTPATIENT
Start: 2019-02-20 | End: 2019-02-20

## 2019-02-20 RX ORDER — OXYCODONE AND ACETAMINOPHEN 5; 325 MG/1; MG/1
1 TABLET ORAL EVERY 4 HOURS
Qty: 0 | Refills: 0 | Status: DISCONTINUED | OUTPATIENT
Start: 2019-02-20 | End: 2019-02-20

## 2019-02-20 RX ORDER — IBUPROFEN 200 MG
600 TABLET ORAL EVERY 8 HOURS
Qty: 0 | Refills: 0 | Status: DISCONTINUED | OUTPATIENT
Start: 2019-02-20 | End: 2019-03-07

## 2019-02-20 RX ORDER — CHLORHEXIDINE GLUCONATE 213 G/1000ML
1 SOLUTION TOPICAL ONCE
Qty: 0 | Refills: 0 | Status: COMPLETED | OUTPATIENT
Start: 2019-02-20 | End: 2019-02-20

## 2019-02-20 RX ORDER — HYDROMORPHONE HYDROCHLORIDE 2 MG/ML
0.5 INJECTION INTRAMUSCULAR; INTRAVENOUS; SUBCUTANEOUS
Qty: 0 | Refills: 0 | Status: DISCONTINUED | OUTPATIENT
Start: 2019-02-20 | End: 2019-02-20

## 2019-02-20 RX ADMIN — SODIUM CHLORIDE 100 MILLILITER(S): 9 INJECTION, SOLUTION INTRAVENOUS at 14:17

## 2019-02-20 RX ADMIN — HYDROMORPHONE HYDROCHLORIDE 0.5 MILLIGRAM(S): 2 INJECTION INTRAMUSCULAR; INTRAVENOUS; SUBCUTANEOUS at 14:17

## 2019-02-20 RX ADMIN — CHLORHEXIDINE GLUCONATE 1 APPLICATION(S): 213 SOLUTION TOPICAL at 10:54

## 2019-02-20 RX ADMIN — HYDROMORPHONE HYDROCHLORIDE 0.5 MILLIGRAM(S): 2 INJECTION INTRAMUSCULAR; INTRAVENOUS; SUBCUTANEOUS at 14:34

## 2019-04-23 ENCOUNTER — OUTPATIENT (OUTPATIENT)
Dept: OUTPATIENT SERVICES | Facility: HOSPITAL | Age: 69
LOS: 1 days | End: 2019-04-23
Payer: COMMERCIAL

## 2019-04-23 VITALS
OXYGEN SATURATION: 98 % | WEIGHT: 190.04 LBS | DIASTOLIC BLOOD PRESSURE: 80 MMHG | HEART RATE: 59 BPM | RESPIRATION RATE: 14 BRPM | HEIGHT: 64 IN | TEMPERATURE: 98 F | SYSTOLIC BLOOD PRESSURE: 140 MMHG

## 2019-04-23 DIAGNOSIS — Z98.890 OTHER SPECIFIED POSTPROCEDURAL STATES: Chronic | ICD-10-CM

## 2019-04-23 DIAGNOSIS — Z87.39 PERSONAL HISTORY OF OTHER DISEASES OF THE MUSCULOSKELETAL SYSTEM AND CONNECTIVE TISSUE: ICD-10-CM

## 2019-04-23 DIAGNOSIS — Z98.49 CATARACT EXTRACTION STATUS, UNSPECIFIED EYE: Chronic | ICD-10-CM

## 2019-04-23 DIAGNOSIS — S43.432A SUPERIOR GLENOID LABRUM LESION OF LEFT SHOULDER, INITIAL ENCOUNTER: ICD-10-CM

## 2019-04-23 DIAGNOSIS — Z90.710 ACQUIRED ABSENCE OF BOTH CERVIX AND UTERUS: Chronic | ICD-10-CM

## 2019-04-23 DIAGNOSIS — Z01.818 ENCOUNTER FOR OTHER PREPROCEDURAL EXAMINATION: ICD-10-CM

## 2019-04-23 DIAGNOSIS — M75.112 INCOMPLETE ROTATOR CUFF TEAR OR RUPTURE OF LEFT SHOULDER, NOT SPECIFIED AS TRAUMATIC: ICD-10-CM

## 2019-04-23 PROBLEM — K21.9 GASTRO-ESOPHAGEAL REFLUX DISEASE WITHOUT ESOPHAGITIS: Chronic | Status: ACTIVE | Noted: 2019-02-08

## 2019-04-23 PROBLEM — E78.5 HYPERLIPIDEMIA, UNSPECIFIED: Chronic | Status: ACTIVE | Noted: 2019-02-08

## 2019-04-23 LAB
ANION GAP SERPL CALC-SCNC: 9 MMOL/L — SIGNIFICANT CHANGE UP (ref 5–17)
BUN SERPL-MCNC: 12 MG/DL — SIGNIFICANT CHANGE UP (ref 7–23)
CALCIUM SERPL-MCNC: 9.8 MG/DL — SIGNIFICANT CHANGE UP (ref 8.4–10.5)
CHLORIDE SERPL-SCNC: 106 MMOL/L — SIGNIFICANT CHANGE UP (ref 96–108)
CO2 SERPL-SCNC: 26 MMOL/L — SIGNIFICANT CHANGE UP (ref 22–31)
CREAT SERPL-MCNC: 0.98 MG/DL — SIGNIFICANT CHANGE UP (ref 0.5–1.3)
GLUCOSE SERPL-MCNC: 94 MG/DL — SIGNIFICANT CHANGE UP (ref 70–99)
HCT VFR BLD CALC: 38 % — SIGNIFICANT CHANGE UP (ref 34.5–45)
HGB BLD-MCNC: 12.9 G/DL — SIGNIFICANT CHANGE UP (ref 11.5–15.5)
MCHC RBC-ENTMCNC: 29.8 PG — SIGNIFICANT CHANGE UP (ref 27–34)
MCHC RBC-ENTMCNC: 33.9 GM/DL — SIGNIFICANT CHANGE UP (ref 32–36)
MCV RBC AUTO: 87.8 FL — SIGNIFICANT CHANGE UP (ref 80–100)
NRBC # BLD: 0 /100 WBCS — SIGNIFICANT CHANGE UP (ref 0–0)
PLATELET # BLD AUTO: 248 K/UL — SIGNIFICANT CHANGE UP (ref 150–400)
POTASSIUM SERPL-MCNC: 3.6 MMOL/L — SIGNIFICANT CHANGE UP (ref 3.5–5.3)
POTASSIUM SERPL-SCNC: 3.6 MMOL/L — SIGNIFICANT CHANGE UP (ref 3.5–5.3)
RBC # BLD: 4.33 M/UL — SIGNIFICANT CHANGE UP (ref 3.8–5.2)
RBC # FLD: 12.6 % — SIGNIFICANT CHANGE UP (ref 10.3–14.5)
SODIUM SERPL-SCNC: 141 MMOL/L — SIGNIFICANT CHANGE UP (ref 135–145)
WBC # BLD: 5.5 K/UL — SIGNIFICANT CHANGE UP (ref 3.8–10.5)
WBC # FLD AUTO: 5.5 K/UL — SIGNIFICANT CHANGE UP (ref 3.8–10.5)

## 2019-04-23 PROCEDURE — 93010 ELECTROCARDIOGRAM REPORT: CPT

## 2019-04-23 PROCEDURE — 85027 COMPLETE CBC AUTOMATED: CPT

## 2019-04-23 PROCEDURE — 36415 COLL VENOUS BLD VENIPUNCTURE: CPT

## 2019-04-23 PROCEDURE — G0463: CPT

## 2019-04-23 PROCEDURE — 80048 BASIC METABOLIC PNL TOTAL CA: CPT

## 2019-04-23 PROCEDURE — 93005 ELECTROCARDIOGRAM TRACING: CPT

## 2019-04-23 RX ORDER — LOSARTAN POTASSIUM 100 MG/1
0 TABLET, FILM COATED ORAL
Qty: 90 | Refills: 0 | COMMUNITY

## 2019-04-23 RX ORDER — CEPHALEXIN 500 MG
1 CAPSULE ORAL
Qty: 0 | Refills: 0 | COMMUNITY

## 2019-04-23 RX ORDER — FLUOXETINE HCL 10 MG
0 CAPSULE ORAL
Qty: 90 | Refills: 0 | COMMUNITY

## 2019-04-23 RX ORDER — CYCLOBENZAPRINE HYDROCHLORIDE 10 MG/1
1 TABLET, FILM COATED ORAL
Qty: 0 | Refills: 0 | COMMUNITY

## 2019-04-23 RX ORDER — OMEPRAZOLE 10 MG/1
1 CAPSULE, DELAYED RELEASE ORAL
Qty: 0 | Refills: 0 | COMMUNITY

## 2019-04-23 NOTE — H&P PST ADULT - NSICDXPASTSURGICALHX_GEN_ALL_CORE_FT
PAST SURGICAL HISTORY:  History of varicose vein ligation right 2014    S/P arthroscopy of right knee 2019    S/P cataract surgery bilateral 2014    S/P hemorrhoidectomy 2015    S/P hysterectomy 1995    S/P LASIK surgery of both eyes vision

## 2019-04-23 NOTE — H&P PST ADULT - NSICDXPASTMEDICALHX_GEN_ALL_CORE_FT
PAST MEDICAL HISTORY:  Anxiety and depression     GERD (gastroesophageal reflux disease)     HTN (hypertension)     Hyperlipidemia, unspecified hyperlipidemia type     Language barrier Malian speaking PAST MEDICAL HISTORY:  Anxiety and depression     GERD (gastroesophageal reflux disease)     History of UTI UTI 2 weeks ago, treated with cephalexin    HTN (hypertension)     Hyperlipidemia, unspecified hyperlipidemia type     Language barrier Beninese speaking    Varicose veins
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
## 2019-04-23 NOTE — H&P PST ADULT - NSICDXPROBLEM_GEN_ALL_CORE_FT
PROBLEM DIAGNOSES  Problem: History of rotator cuff tear  Assessment and Plan: left shoulder arthroscopy   Medical clearance   Pre op instructions

## 2019-04-23 NOTE — H&P PST ADULT - NSICDXFAMILYHX_GEN_ALL_CORE_FT
FAMILY HISTORY:  Sibling  Still living? Yes, Estimated age: 51-60  Family history of diabetes mellitus, Age at diagnosis: Age Unknown  Family history of hypertension, Age at diagnosis: Age Unknown

## 2019-04-24 PROBLEM — M25.561 PAIN IN RIGHT KNEE: Chronic | Status: INACTIVE | Noted: 2019-02-08 | Resolved: 2019-04-23

## 2019-04-24 PROBLEM — Z78.9 OTHER SPECIFIED HEALTH STATUS: Chronic | Status: ACTIVE | Noted: 2019-02-08

## 2019-04-24 PROBLEM — F41.8 OTHER SPECIFIED ANXIETY DISORDERS: Chronic | Status: INACTIVE | Noted: 2019-02-08 | Resolved: 2019-04-23

## 2019-04-24 NOTE — ASU DISCHARGE PLAN (ADULT/PEDIATRIC) - CARE PROVIDER_API CALL
Gold Upton)  Orthopaedic Surgery  [address] [place] [address]  Phone: [phone]  Fax: [phone]  Follow Up Time:

## 2019-04-24 NOTE — ASU DISCHARGE PLAN (ADULT/PEDIATRIC) - CALL YOUR DOCTOR IF YOU HAVE ANY OF THE FOLLOWING:
Pain not relieved by Medications/Wound/Surgical Site with redness, or foul smelling discharge or pus/Inability to tolerate liquids or foods/Fever greater than (need to indicate Fahrenheit or Celsius)

## 2019-04-24 NOTE — ASU DISCHARGE PLAN (ADULT/PEDIATRIC) - ASU DC SPECIAL INSTRUCTIONSFT
ice packs to surgical site 20 minutes on and 20 minutes off while awake for next 24 hours  pain medication as instructed by M.D.  Take an over the counter stool softener like Colace to avoid constipation while taking a narcotic for pain  clean sites with alcohol and apply band aids after dressing is removed ice packs to surgical site 20 minutes on and 20 minutes off while awake for next 24 hours  pain medication as instructed by M.D.  Take an over the counter stool softener like Colace to avoid constipation while taking a narcotic for pain  clean sites with alcohol and apply band aids after dressing is removed  oxycodone as prescribed for moderate to severe pain.  Tylenol over the counter as directed for mild pain

## 2019-04-24 NOTE — ASU DISCHARGE PLAN (ADULT/PEDIATRIC) - ACTIVITY LEVEL
sling for comfort/No heavy lifting/Elevate extremity No excercise/No heavy lifting/No tub baths/sling for comfort

## 2019-04-25 ENCOUNTER — TRANSCRIPTION ENCOUNTER (OUTPATIENT)
Age: 69
End: 2019-04-25

## 2019-04-25 NOTE — ASU PATIENT PROFILE, ADULT - PSH
History of varicose vein ligation  right 2014  S/P arthroscopy of right knee  2019  S/P cataract surgery  bilateral 2014  S/P hemorrhoidectomy  2015  S/P hysterectomy  1995  S/P LASIK surgery of both eyes  vision

## 2019-04-25 NOTE — ASU PATIENT PROFILE, ADULT - PMH
Anxiety and depression    GERD (gastroesophageal reflux disease)    History of UTI  UTI 2 weeks ago, treated with cephalexin  HTN (hypertension)    Hyperlipidemia, unspecified hyperlipidemia type    Language barrier  Telugu speaking  Varicose veins

## 2019-04-26 ENCOUNTER — RESULT REVIEW (OUTPATIENT)
Age: 69
End: 2019-04-26

## 2019-04-26 ENCOUNTER — OUTPATIENT (OUTPATIENT)
Dept: OUTPATIENT SERVICES | Facility: HOSPITAL | Age: 69
LOS: 1 days | End: 2019-04-26
Payer: COMMERCIAL

## 2019-04-26 VITALS
HEART RATE: 70 BPM | DIASTOLIC BLOOD PRESSURE: 77 MMHG | RESPIRATION RATE: 16 BRPM | OXYGEN SATURATION: 98 % | SYSTOLIC BLOOD PRESSURE: 134 MMHG

## 2019-04-26 VITALS
HEIGHT: 65 IN | OXYGEN SATURATION: 98 % | SYSTOLIC BLOOD PRESSURE: 155 MMHG | HEART RATE: 60 BPM | DIASTOLIC BLOOD PRESSURE: 75 MMHG | TEMPERATURE: 98 F | WEIGHT: 187.83 LBS | RESPIRATION RATE: 16 BRPM

## 2019-04-26 DIAGNOSIS — Z98.890 OTHER SPECIFIED POSTPROCEDURAL STATES: Chronic | ICD-10-CM

## 2019-04-26 DIAGNOSIS — Z98.49 CATARACT EXTRACTION STATUS, UNSPECIFIED EYE: Chronic | ICD-10-CM

## 2019-04-26 DIAGNOSIS — M75.112 INCOMPLETE ROTATOR CUFF TEAR OR RUPTURE OF LEFT SHOULDER, NOT SPECIFIED AS TRAUMATIC: ICD-10-CM

## 2019-04-26 DIAGNOSIS — Z01.818 ENCOUNTER FOR OTHER PREPROCEDURAL EXAMINATION: ICD-10-CM

## 2019-04-26 DIAGNOSIS — S43.432A SUPERIOR GLENOID LABRUM LESION OF LEFT SHOULDER, INITIAL ENCOUNTER: ICD-10-CM

## 2019-04-26 DIAGNOSIS — Z90.710 ACQUIRED ABSENCE OF BOTH CERVIX AND UTERUS: Chronic | ICD-10-CM

## 2019-04-26 PROCEDURE — C1713: CPT

## 2019-04-26 PROCEDURE — 29826 SHO ARTHRS SRG DECOMPRESSION: CPT | Mod: LT

## 2019-04-26 PROCEDURE — 29807 SHO ARTHRS SRG RPR SLAP LES: CPT | Mod: LT

## 2019-04-26 PROCEDURE — 88304 TISSUE EXAM BY PATHOLOGIST: CPT

## 2019-04-26 PROCEDURE — 29824 SHO ARTHRS SRG DSTL CLAVICLC: CPT | Mod: LT

## 2019-04-26 PROCEDURE — 88304 TISSUE EXAM BY PATHOLOGIST: CPT | Mod: 26

## 2019-04-26 RX ORDER — OXYCODONE HYDROCHLORIDE 5 MG/1
1 TABLET ORAL
Qty: 30 | Refills: 0
Start: 2019-04-26 | End: 2019-04-30

## 2019-04-26 RX ORDER — HYDROMORPHONE HYDROCHLORIDE 2 MG/ML
0.5 INJECTION INTRAMUSCULAR; INTRAVENOUS; SUBCUTANEOUS
Qty: 0 | Refills: 0 | Status: DISCONTINUED | OUTPATIENT
Start: 2019-04-26 | End: 2019-04-26

## 2019-04-26 RX ORDER — SODIUM CHLORIDE 9 MG/ML
1000 INJECTION, SOLUTION INTRAVENOUS
Qty: 0 | Refills: 0 | Status: DISCONTINUED | OUTPATIENT
Start: 2019-04-26 | End: 2019-04-26

## 2019-04-26 RX ORDER — ACETAMINOPHEN 500 MG
1000 TABLET ORAL ONCE
Qty: 0 | Refills: 0 | Status: DISCONTINUED | OUTPATIENT
Start: 2019-04-26 | End: 2019-04-26

## 2019-04-26 RX ORDER — ONDANSETRON 8 MG/1
4 TABLET, FILM COATED ORAL ONCE
Qty: 0 | Refills: 0 | Status: DISCONTINUED | OUTPATIENT
Start: 2019-04-26 | End: 2019-04-26

## 2019-04-26 RX ORDER — CHLORHEXIDINE GLUCONATE 213 G/1000ML
1 SOLUTION TOPICAL ONCE
Qty: 0 | Refills: 0 | Status: COMPLETED | OUTPATIENT
Start: 2019-04-26 | End: 2019-04-26

## 2019-04-26 RX ADMIN — CHLORHEXIDINE GLUCONATE 1 APPLICATION(S): 213 SOLUTION TOPICAL at 06:24

## 2019-04-26 RX ADMIN — HYDROMORPHONE HYDROCHLORIDE 0.5 MILLIGRAM(S): 2 INJECTION INTRAMUSCULAR; INTRAVENOUS; SUBCUTANEOUS at 11:48

## 2019-04-26 RX ADMIN — SODIUM CHLORIDE 100 MILLILITER(S): 9 INJECTION, SOLUTION INTRAVENOUS at 12:17

## 2019-04-26 RX ADMIN — HYDROMORPHONE HYDROCHLORIDE 0.5 MILLIGRAM(S): 2 INJECTION INTRAMUSCULAR; INTRAVENOUS; SUBCUTANEOUS at 12:05

## 2019-04-26 NOTE — BRIEF OPERATIVE NOTE - NSICDXBRIEFPREOP_GEN_ALL_CORE_FT
PRE-OP DIAGNOSIS:  Rotator cuff tear 26-Apr-2019 11:52:36  Iván Baron  SLAP lesion of shoulder 26-Apr-2019 11:52:14  Iván Baron

## 2019-04-26 NOTE — BRIEF OPERATIVE NOTE - NSICDXBRIEFPOSTOP_GEN_ALL_CORE_FT
POST-OP DIAGNOSIS:  Partial tear of left rotator cuff 26-Apr-2019 11:53:26  Iván Baron  SLAP shoulder tear 26-Apr-2019 11:53:00  Iván Baron

## 2019-07-01 RX ORDER — VALSARTAN AND HYDROCHLOROTHIAZIDE 80; 12.5 MG/1; MG/1
80-12.5 TABLET, FILM COATED ORAL DAILY
Refills: 0 | Status: ACTIVE | COMMUNITY

## 2019-07-02 ENCOUNTER — NON-APPOINTMENT (OUTPATIENT)
Age: 69
End: 2019-07-02

## 2019-07-02 ENCOUNTER — APPOINTMENT (OUTPATIENT)
Dept: CARDIOLOGY | Facility: CLINIC | Age: 69
End: 2019-07-02
Payer: MEDICARE

## 2019-07-02 VITALS
HEIGHT: 65 IN | BODY MASS INDEX: 31.32 KG/M2 | OXYGEN SATURATION: 95 % | SYSTOLIC BLOOD PRESSURE: 140 MMHG | DIASTOLIC BLOOD PRESSURE: 78 MMHG | WEIGHT: 188 LBS | HEART RATE: 67 BPM

## 2019-07-02 DIAGNOSIS — E78.00 PURE HYPERCHOLESTEROLEMIA, UNSPECIFIED: ICD-10-CM

## 2019-07-02 DIAGNOSIS — I10 ESSENTIAL (PRIMARY) HYPERTENSION: ICD-10-CM

## 2019-07-02 DIAGNOSIS — R53.83 OTHER FATIGUE: ICD-10-CM

## 2019-07-02 PROBLEM — I83.90 ASYMPTOMATIC VARICOSE VEINS OF UNSPECIFIED LOWER EXTREMITY: Chronic | Status: ACTIVE | Noted: 2019-04-23

## 2019-07-02 PROBLEM — F41.9 ANXIETY DISORDER, UNSPECIFIED: Chronic | Status: ACTIVE | Noted: 2019-04-23

## 2019-07-02 PROCEDURE — 93000 ELECTROCARDIOGRAM COMPLETE: CPT

## 2019-07-02 PROCEDURE — 99214 OFFICE O/P EST MOD 30 MIN: CPT

## 2019-07-02 RX ORDER — OXYBUTYNIN CHLORIDE 5 MG/1
5 TABLET ORAL
Refills: 0 | Status: DISCONTINUED | COMMUNITY
End: 2019-07-02

## 2019-07-02 RX ORDER — TRAMADOL HYDROCHLORIDE 50 MG/1
50 TABLET, COATED ORAL
Refills: 0 | Status: DISCONTINUED | COMMUNITY
End: 2019-07-02

## 2019-07-02 NOTE — PHYSICAL EXAM
[Normal Appearance] : normal appearance [General Appearance - Well Developed] : well developed [Well Groomed] : well groomed [General Appearance - Well Nourished] : well nourished [No Deformities] : no deformities [General Appearance - In No Acute Distress] : no acute distress [Normal Conjunctiva] : the conjunctiva exhibited no abnormalities [Eyelids - No Xanthelasma] : the eyelids demonstrated no xanthelasmas [Normal Oral Mucosa] : normal oral mucosa [No Oral Pallor] : no oral pallor [No Oral Cyanosis] : no oral cyanosis [Normal Jugular Venous V Waves Present] : normal jugular venous V waves present [Respiration, Rhythm And Depth] : normal respiratory rhythm and effort [Exaggerated Use Of Accessory Muscles For Inspiration] : no accessory muscle use [Auscultation Breath Sounds / Voice Sounds] : lungs were clear to auscultation bilaterally [Heart Rate And Rhythm] : heart rate and rhythm were normal [Heart Sounds] : normal S1 and S2 [Murmurs] : no murmurs present [Edema] : no peripheral edema present [Arterial Pulses Normal] : the arterial pulses were normal [Abdomen Soft] : soft [Abdomen Tenderness] : non-tender [Nail Clubbing] : no clubbing of the fingernails [Cyanosis, Localized] : no localized cyanosis [Petechial Hemorrhages (___cm)] : no petechial hemorrhages [Skin Color & Pigmentation] : normal skin color and pigmentation [] : no rash [No Venous Stasis] : no venous stasis [Skin Lesions] : no skin lesions [No Skin Ulcers] : no skin ulcer [No Xanthoma] : no  xanthoma was observed [Affect] : the affect was normal [Oriented To Time, Place, And Person] : oriented to person, place, and time [Mood] : the mood was normal [No Anxiety] : not feeling anxious [FreeTextEntry1] : abnormal gait, insufficient for stress testing

## 2019-07-02 NOTE — REVIEW OF SYSTEMS
[Joint Pain] : joint pain [Joint Stiffness] : joint stiffness [Hip Pain] : hip pain [Knee Pain] : knee pain [Lower Back Pain] : lower back pain [Negative] : Heme/Lymph [Shortness Of Breath] : no shortness of breath [Palpitations] : no palpitations

## 2019-07-02 NOTE — ASSESSMENT
[FreeTextEntry1] : 1. palpitations - No arrhythmias noted on event monitor.  Echo did not demonstrate structural heart disease with the exception of LVH.  May be secondary to anxiety. Did not get palpitations while wearing the event monitor.  Will consider repeating event monitor if palpitations return.  For now, no further workup. Avoid caffeine and etoh. \par 2. hypertension - borderline, continue current regimen; continue valsartan-hctz and metoprolol; Will re-evaluate with stress test\par 3. hyperlipidemia - on statin therapy, no xanthomas or corneal arcus on exam. Yearly lipid panel with PCP.  Goal LDL < 100.\par 4. fatigue and abnormal ECG - will send for ischemia evaluation, repeat TTE; sleep study\par 4. preventive - urged to exercise - recumbent bike/aqua exercises\par \par RTC after testing\par \par Thank you for allowing me to participate in your patient's care.  Please contact me if there are any questions or concerns.\par \par \par

## 2019-07-02 NOTE — REASON FOR VISIT
[Follow-Up - Clinic] : a clinic follow-up of [Hypertension] : hypertension [Palpitations] : palpitations [[employer] ] : provided by [employer]

## 2019-07-02 NOTE — HISTORY OF PRESENT ILLNESS
[FreeTextEntry1] : 70 y/o  female with hypertension, hyperlipidemia, with  palpitations for the past month, happens on a weekly basis 9/10, lasts usually for 10 minutes, sometimes associated with throat tightness.  Associated with dizziness and weak.    Usually occurs at rest.  When she exercises, she does not get palpitations.  No caffeine.  Has been on metoprolol for the past 3 years.   No significant recent medication changes. Associated with bilateral arm pain, neck pain, and pain underneath left breast.  No chest pain. Associated with shortness of breath.  Cycles, elliptical, for 15-20 minutes at  a time, 5 days a week.   3 yrsn prior had a cardiology workup \par \par 9/19/17 Returns for follow up.  In the interim, had event monitor - negative for arrhythmias. Echo normal biventricular systolic function, mild LVH, mild diastolic dysfunction. No further palpitations.  Did not have palpitations while wearing event monitor.  \par \par 7/10/18 Returns for follow up.  In the interim, feels well overall.  Occasionally with palpitations when she doesn't take her atenolol.   She had the flu and had chest pain. No routine exercise due to back pain.  Carotid dopplers with mild disease bilaterally.  \par \par 7/2/19 Returns for follow up.  In the interim, reports intermittent palpitations, less than before. Infrequent in nature, not bothersome.   No chest pain or shortness of breath some fatigue. \par \par

## 2019-07-22 ENCOUNTER — OTHER (OUTPATIENT)
Age: 69
End: 2019-07-22

## 2019-07-22 ENCOUNTER — APPOINTMENT (OUTPATIENT)
Dept: CARDIOLOGY | Facility: CLINIC | Age: 69
End: 2019-07-22

## 2019-07-22 DIAGNOSIS — R94.31 ABNORMAL ELECTROCARDIOGRAM [ECG] [EKG]: ICD-10-CM

## 2019-08-06 ENCOUNTER — APPOINTMENT (OUTPATIENT)
Dept: CARDIOLOGY | Facility: CLINIC | Age: 69
End: 2019-08-06

## 2019-10-02 PROBLEM — Z60.2 PERSON LIVING ALONE: Status: ACTIVE | Noted: 2017-08-10

## 2019-10-14 ENCOUNTER — APPOINTMENT (OUTPATIENT)
Dept: CARDIOLOGY | Facility: CLINIC | Age: 69
End: 2019-10-14
Payer: MEDICARE

## 2019-10-14 PROCEDURE — 93306 TTE W/DOPPLER COMPLETE: CPT

## 2019-10-22 ENCOUNTER — APPOINTMENT (OUTPATIENT)
Dept: CARDIOLOGY | Facility: CLINIC | Age: 69
End: 2019-10-22
Payer: MEDICARE

## 2019-10-22 PROCEDURE — 93015 CV STRESS TEST SUPVJ I&R: CPT

## 2019-10-22 PROCEDURE — A9500: CPT

## 2019-10-22 PROCEDURE — 78452 HT MUSCLE IMAGE SPECT MULT: CPT

## 2020-01-01 ENCOUNTER — OUTPATIENT (OUTPATIENT)
Dept: OUTPATIENT SERVICES | Facility: HOSPITAL | Age: 70
LOS: 1 days | End: 2020-01-01
Payer: MEDICARE

## 2020-01-01 DIAGNOSIS — Z98.890 OTHER SPECIFIED POSTPROCEDURAL STATES: Chronic | ICD-10-CM

## 2020-01-01 DIAGNOSIS — Z98.49 CATARACT EXTRACTION STATUS, UNSPECIFIED EYE: Chronic | ICD-10-CM

## 2020-01-01 DIAGNOSIS — Z90.710 ACQUIRED ABSENCE OF BOTH CERVIX AND UTERUS: Chronic | ICD-10-CM

## 2020-01-01 PROCEDURE — G9001: CPT

## 2020-01-16 ENCOUNTER — EMERGENCY (EMERGENCY)
Facility: HOSPITAL | Age: 70
LOS: 1 days | Discharge: HOME | End: 2020-01-16
Attending: EMERGENCY MEDICINE
Payer: COMMERCIAL

## 2020-01-16 VITALS
RESPIRATION RATE: 20 BRPM | DIASTOLIC BLOOD PRESSURE: 84 MMHG | HEART RATE: 75 BPM | HEIGHT: 65 IN | WEIGHT: 186.07 LBS | OXYGEN SATURATION: 95 % | SYSTOLIC BLOOD PRESSURE: 133 MMHG | TEMPERATURE: 98 F

## 2020-01-16 DIAGNOSIS — Z98.890 OTHER SPECIFIED POSTPROCEDURAL STATES: Chronic | ICD-10-CM

## 2020-01-16 DIAGNOSIS — Z90.710 ACQUIRED ABSENCE OF BOTH CERVIX AND UTERUS: Chronic | ICD-10-CM

## 2020-01-16 DIAGNOSIS — Z98.49 CATARACT EXTRACTION STATUS, UNSPECIFIED EYE: Chronic | ICD-10-CM

## 2020-01-16 PROCEDURE — 99282 EMERGENCY DEPT VISIT SF MDM: CPT

## 2020-01-16 PROCEDURE — 99283 EMERGENCY DEPT VISIT LOW MDM: CPT

## 2020-01-16 PROCEDURE — T1013: CPT

## 2020-01-16 NOTE — ED STATDOCS - PMH
Anxiety and depression    GERD (gastroesophageal reflux disease)    History of UTI  UTI 2 weeks ago, treated with cephalexin  HTN (hypertension)    Hyperlipidemia, unspecified hyperlipidemia type    Language barrier  Kazakh speaking  Varicose veins
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
## 2020-01-16 NOTE — ED STATDOCS - PATIENT PORTAL LINK FT
You can access the FollowMyHealth Patient Portal offered by [facility] by registering at the following website: [email or web address]. By joining [employer]’s FollowMyHealth portal, you will also be able to view your health information using other applications (apps) compatible with our system.

## 2020-01-16 NOTE — ED STATDOCS - PHYSICAL EXAMINATION
Gen: Well appearing in NAD  Head: NC/AT  Neck: trachea midline  Resp:  No distress, lungs CTA bilaterally  CV: RRR  Ext: no deformities  Abd: Soft, non tender, non distended, Ecchymosis to right side of abdomen (where she was receiving heparin shots)  Neuro:  A&O appears non focal  Skin:  Warm and dry as visualized  Psych:  Normal affect and mood

## 2020-01-16 NOTE — ED ADULT TRIAGE NOTE - CCCP TRG CHIEF CMPLNT
abdominal pain Advancement Flap (Single) Text: The defect edges were debeveled with a #15 scalpel blade.  Given the location of the defect and the proximity to free margins a single advancement flap was deemed most appropriate.  Using a sterile surgical marker, an appropriate advancement flap was drawn incorporating the defect and placing the expected incisions within the relaxed skin tension lines where possible.    The area thus outlined was incised deep to adipose tissue with a #15 scalpel blade.  The skin margins were undermined to an appropriate distance in all directions utilizing iris scissors.
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
## 2020-01-17 ENCOUNTER — EMERGENCY (EMERGENCY)
Facility: HOSPITAL | Age: 70
LOS: 1 days | Discharge: HOME | End: 2020-01-17
Attending: EMERGENCY MEDICINE
Payer: COMMERCIAL

## 2020-01-17 VITALS
TEMPERATURE: 98 F | HEIGHT: 65 IN | WEIGHT: 186.07 LBS | DIASTOLIC BLOOD PRESSURE: 83 MMHG | SYSTOLIC BLOOD PRESSURE: 121 MMHG | RESPIRATION RATE: 20 BRPM | HEART RATE: 91 BPM

## 2020-01-17 DIAGNOSIS — Z98.890 OTHER SPECIFIED POSTPROCEDURAL STATES: Chronic | ICD-10-CM

## 2020-01-17 DIAGNOSIS — Z98.49 CATARACT EXTRACTION STATUS, UNSPECIFIED EYE: Chronic | ICD-10-CM

## 2020-01-17 DIAGNOSIS — Z90.710 ACQUIRED ABSENCE OF BOTH CERVIX AND UTERUS: Chronic | ICD-10-CM

## 2020-01-17 LAB
ALBUMIN SERPL ELPH-MCNC: 4.3 G/DL — SIGNIFICANT CHANGE UP (ref 3.3–5.2)
ALP SERPL-CCNC: 101 U/L — SIGNIFICANT CHANGE UP (ref 40–120)
ALT FLD-CCNC: 28 U/L — SIGNIFICANT CHANGE UP
ANION GAP SERPL CALC-SCNC: 14 MMOL/L — SIGNIFICANT CHANGE UP (ref 5–17)
AST SERPL-CCNC: 27 U/L — SIGNIFICANT CHANGE UP
BASOPHILS # BLD AUTO: 0.04 K/UL — SIGNIFICANT CHANGE UP (ref 0–0.2)
BASOPHILS NFR BLD AUTO: 0.6 % — SIGNIFICANT CHANGE UP (ref 0–2)
BILIRUB SERPL-MCNC: 0.6 MG/DL — SIGNIFICANT CHANGE UP (ref 0.4–2)
BUN SERPL-MCNC: 22 MG/DL — HIGH (ref 8–20)
CALCIUM SERPL-MCNC: 10.1 MG/DL — SIGNIFICANT CHANGE UP (ref 8.6–10.2)
CHLORIDE SERPL-SCNC: 99 MMOL/L — SIGNIFICANT CHANGE UP (ref 98–107)
CO2 SERPL-SCNC: 25 MMOL/L — SIGNIFICANT CHANGE UP (ref 22–29)
CREAT SERPL-MCNC: 0.95 MG/DL — SIGNIFICANT CHANGE UP (ref 0.5–1.3)
EOSINOPHIL # BLD AUTO: 0.78 K/UL — HIGH (ref 0–0.5)
EOSINOPHIL NFR BLD AUTO: 12.4 % — HIGH (ref 0–6)
GLUCOSE SERPL-MCNC: 108 MG/DL — SIGNIFICANT CHANGE UP (ref 70–115)
HCT VFR BLD CALC: 38.1 % — SIGNIFICANT CHANGE UP (ref 34.5–45)
HGB BLD-MCNC: 12.7 G/DL — SIGNIFICANT CHANGE UP (ref 11.5–15.5)
IMM GRANULOCYTES NFR BLD AUTO: 0.3 % — SIGNIFICANT CHANGE UP (ref 0–1.5)
LYMPHOCYTES # BLD AUTO: 1.95 K/UL — SIGNIFICANT CHANGE UP (ref 1–3.3)
LYMPHOCYTES # BLD AUTO: 31.1 % — SIGNIFICANT CHANGE UP (ref 13–44)
MCHC RBC-ENTMCNC: 29.3 PG — SIGNIFICANT CHANGE UP (ref 27–34)
MCHC RBC-ENTMCNC: 33.3 GM/DL — SIGNIFICANT CHANGE UP (ref 32–36)
MCV RBC AUTO: 87.8 FL — SIGNIFICANT CHANGE UP (ref 80–100)
MONOCYTES # BLD AUTO: 0.58 K/UL — SIGNIFICANT CHANGE UP (ref 0–0.9)
MONOCYTES NFR BLD AUTO: 9.2 % — SIGNIFICANT CHANGE UP (ref 2–14)
NEUTROPHILS # BLD AUTO: 2.91 K/UL — SIGNIFICANT CHANGE UP (ref 1.8–7.4)
NEUTROPHILS NFR BLD AUTO: 46.4 % — SIGNIFICANT CHANGE UP (ref 43–77)
PLATELET # BLD AUTO: 307 K/UL — SIGNIFICANT CHANGE UP (ref 150–400)
POTASSIUM SERPL-MCNC: 4.6 MMOL/L — SIGNIFICANT CHANGE UP (ref 3.5–5.3)
POTASSIUM SERPL-SCNC: 4.6 MMOL/L — SIGNIFICANT CHANGE UP (ref 3.5–5.3)
PROT SERPL-MCNC: 7.7 G/DL — SIGNIFICANT CHANGE UP (ref 6.6–8.7)
RBC # BLD: 4.34 M/UL — SIGNIFICANT CHANGE UP (ref 3.8–5.2)
RBC # FLD: 12.5 % — SIGNIFICANT CHANGE UP (ref 10.3–14.5)
SODIUM SERPL-SCNC: 138 MMOL/L — SIGNIFICANT CHANGE UP (ref 135–145)
WBC # BLD: 6.28 K/UL — SIGNIFICANT CHANGE UP (ref 3.8–10.5)
WBC # FLD AUTO: 6.28 K/UL — SIGNIFICANT CHANGE UP (ref 3.8–10.5)

## 2020-01-17 PROCEDURE — 99284 EMERGENCY DEPT VISIT MOD MDM: CPT | Mod: 25

## 2020-01-17 PROCEDURE — 85027 COMPLETE CBC AUTOMATED: CPT

## 2020-01-17 PROCEDURE — T1013: CPT

## 2020-01-17 PROCEDURE — 99284 EMERGENCY DEPT VISIT MOD MDM: CPT

## 2020-01-17 PROCEDURE — 71260 CT THORAX DX C+: CPT | Mod: 26

## 2020-01-17 PROCEDURE — 96374 THER/PROPH/DIAG INJ IV PUSH: CPT | Mod: XU

## 2020-01-17 PROCEDURE — 74177 CT ABD & PELVIS W/CONTRAST: CPT | Mod: 26

## 2020-01-17 PROCEDURE — 74177 CT ABD & PELVIS W/CONTRAST: CPT

## 2020-01-17 PROCEDURE — 80053 COMPREHEN METABOLIC PANEL: CPT

## 2020-01-17 PROCEDURE — 36415 COLL VENOUS BLD VENIPUNCTURE: CPT

## 2020-01-17 PROCEDURE — 71260 CT THORAX DX C+: CPT

## 2020-01-17 RX ORDER — MORPHINE SULFATE 50 MG/1
4 CAPSULE, EXTENDED RELEASE ORAL ONCE
Refills: 0 | Status: DISCONTINUED | OUTPATIENT
Start: 2020-01-17 | End: 2020-01-17

## 2020-01-17 RX ADMIN — MORPHINE SULFATE 4 MILLIGRAM(S): 50 CAPSULE, EXTENDED RELEASE ORAL at 14:37
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
## 2020-01-17 NOTE — ED STATDOCS - PROGRESS NOTE DETAILS
Pt moved form intake Room. Pt seen and evaluated by intake Physician. HPI, Physical examination performed by intake Physician . Note reviewed and followup examination performed by me consistent with initial assessment. Agrees with intake Physician plan and tests. CT chest and abdomen normal .Pt made aware of result and will F/U with her PCP .
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
## 2020-01-17 NOTE — ED STATDOCS - PMH
Anxiety and depression    GERD (gastroesophageal reflux disease)    History of UTI  UTI 2 weeks ago, treated with cephalexin  HTN (hypertension)    Hyperlipidemia, unspecified hyperlipidemia type    Language barrier  Mohawk speaking  Varicose veins
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
## 2020-01-17 NOTE — ED STATDOCS - PATIENT PORTAL LINK FT
You can access the FollowMyHealth Patient Portal offered by [facility] by registering at the following website: [email or web address]. By joining [facility]’s FollowMyHealth portal, you will also be able to view your health information using other applications (apps) compatible with our system.
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
## 2020-01-29 DIAGNOSIS — Z71.89 OTHER SPECIFIED COUNSELING: ICD-10-CM

## 2020-02-08 ENCOUNTER — APPOINTMENT (OUTPATIENT)
Dept: MAMMOGRAPHY | Facility: CLINIC | Age: 70
End: 2020-02-08

## 2020-02-21 ENCOUNTER — OUTPATIENT (OUTPATIENT)
Dept: OUTPATIENT SERVICES | Facility: HOSPITAL | Age: 70
LOS: 1 days | End: 2020-02-21
Payer: COMMERCIAL

## 2020-02-21 ENCOUNTER — APPOINTMENT (OUTPATIENT)
Dept: MAMMOGRAPHY | Facility: CLINIC | Age: 70
End: 2020-02-21

## 2020-02-21 VITALS
TEMPERATURE: 98 F | WEIGHT: 186.95 LBS | HEART RATE: 78 BPM | OXYGEN SATURATION: 97 % | SYSTOLIC BLOOD PRESSURE: 124 MMHG | HEIGHT: 65 IN | DIASTOLIC BLOOD PRESSURE: 76 MMHG | RESPIRATION RATE: 15 BRPM

## 2020-02-21 DIAGNOSIS — S83.242A OTHER TEAR OF MEDIAL MENISCUS, CURRENT INJURY, LEFT KNEE, INITIAL ENCOUNTER: ICD-10-CM

## 2020-02-21 DIAGNOSIS — Z90.710 ACQUIRED ABSENCE OF BOTH CERVIX AND UTERUS: Chronic | ICD-10-CM

## 2020-02-21 DIAGNOSIS — Z01.818 ENCOUNTER FOR OTHER PREPROCEDURAL EXAMINATION: ICD-10-CM

## 2020-02-21 DIAGNOSIS — Z98.890 OTHER SPECIFIED POSTPROCEDURAL STATES: Chronic | ICD-10-CM

## 2020-02-21 DIAGNOSIS — M22.42 CHONDROMALACIA PATELLAE, LEFT KNEE: ICD-10-CM

## 2020-02-21 DIAGNOSIS — Z98.49 CATARACT EXTRACTION STATUS, UNSPECIFIED EYE: Chronic | ICD-10-CM

## 2020-02-21 DIAGNOSIS — S89.90XA UNSPECIFIED INJURY OF UNSPECIFIED LOWER LEG, INITIAL ENCOUNTER: ICD-10-CM

## 2020-02-21 LAB
ALBUMIN SERPL ELPH-MCNC: 3.7 G/DL — SIGNIFICANT CHANGE UP (ref 3.3–5)
ALP SERPL-CCNC: 83 U/L — SIGNIFICANT CHANGE UP (ref 30–120)
ALT FLD-CCNC: 38 U/L DA — SIGNIFICANT CHANGE UP (ref 10–60)
ANION GAP SERPL CALC-SCNC: 8 MMOL/L — SIGNIFICANT CHANGE UP (ref 5–17)
AST SERPL-CCNC: 27 U/L — SIGNIFICANT CHANGE UP (ref 10–40)
BILIRUB SERPL-MCNC: 0.6 MG/DL — SIGNIFICANT CHANGE UP (ref 0.2–1.2)
BUN SERPL-MCNC: 23 MG/DL — SIGNIFICANT CHANGE UP (ref 7–23)
CALCIUM SERPL-MCNC: 9.6 MG/DL — SIGNIFICANT CHANGE UP (ref 8.4–10.5)
CHLORIDE SERPL-SCNC: 106 MMOL/L — SIGNIFICANT CHANGE UP (ref 96–108)
CO2 SERPL-SCNC: 26 MMOL/L — SIGNIFICANT CHANGE UP (ref 22–31)
CREAT SERPL-MCNC: 1.13 MG/DL — SIGNIFICANT CHANGE UP (ref 0.5–1.3)
GLUCOSE SERPL-MCNC: 108 MG/DL — HIGH (ref 70–99)
HCT VFR BLD CALC: 40.2 % — SIGNIFICANT CHANGE UP (ref 34.5–45)
HGB BLD-MCNC: 13.4 G/DL — SIGNIFICANT CHANGE UP (ref 11.5–15.5)
MCHC RBC-ENTMCNC: 29.6 PG — SIGNIFICANT CHANGE UP (ref 27–34)
MCHC RBC-ENTMCNC: 33.3 GM/DL — SIGNIFICANT CHANGE UP (ref 32–36)
MCV RBC AUTO: 88.7 FL — SIGNIFICANT CHANGE UP (ref 80–100)
NRBC # BLD: 0 /100 WBCS — SIGNIFICANT CHANGE UP (ref 0–0)
PLATELET # BLD AUTO: 255 K/UL — SIGNIFICANT CHANGE UP (ref 150–400)
POTASSIUM SERPL-MCNC: 4.4 MMOL/L — SIGNIFICANT CHANGE UP (ref 3.5–5.3)
POTASSIUM SERPL-SCNC: 4.4 MMOL/L — SIGNIFICANT CHANGE UP (ref 3.5–5.3)
PROT SERPL-MCNC: 7.8 G/DL — SIGNIFICANT CHANGE UP (ref 6–8.3)
RBC # BLD: 4.53 M/UL — SIGNIFICANT CHANGE UP (ref 3.8–5.2)
RBC # FLD: 12.7 % — SIGNIFICANT CHANGE UP (ref 10.3–14.5)
SODIUM SERPL-SCNC: 140 MMOL/L — SIGNIFICANT CHANGE UP (ref 135–145)
WBC # BLD: 5.85 K/UL — SIGNIFICANT CHANGE UP (ref 3.8–10.5)
WBC # FLD AUTO: 5.85 K/UL — SIGNIFICANT CHANGE UP (ref 3.8–10.5)

## 2020-02-21 PROCEDURE — G0463: CPT

## 2020-02-21 PROCEDURE — 85027 COMPLETE CBC AUTOMATED: CPT

## 2020-02-21 PROCEDURE — 93010 ELECTROCARDIOGRAM REPORT: CPT

## 2020-02-21 PROCEDURE — 80053 COMPREHEN METABOLIC PANEL: CPT

## 2020-02-21 PROCEDURE — 93005 ELECTROCARDIOGRAM TRACING: CPT

## 2020-02-21 PROCEDURE — 36415 COLL VENOUS BLD VENIPUNCTURE: CPT

## 2020-02-21 RX ORDER — FLUOXETINE HCL 10 MG
1 CAPSULE ORAL
Qty: 0 | Refills: 0 | DISCHARGE

## 2020-02-21 RX ORDER — OMEPRAZOLE 10 MG/1
1 CAPSULE, DELAYED RELEASE ORAL
Qty: 0 | Refills: 0 | DISCHARGE

## 2020-02-21 NOTE — H&P PST ADULT - ASSESSMENT
68 yo female is scheduled for arthroscopic left knee partial medial meniscectomy chondroplasty microfracture drilling on 2/24/2020 with Dr Upton at [facility].

## 2020-02-21 NOTE — H&P PST ADULT - NSICDXPROBLEM_GEN_ALL_CORE_FT
PROBLEM DIAGNOSES  Problem: Knee injury  Assessment and Plan: Arthroscopic left knee partial medial meniscectomy chondroplasty microfracture drilling on 2/24/2020  Pending medical clearance   Testing performed  Instructions reviewed using [employer] phone  Best wishes offered

## 2020-02-21 NOTE — H&P PST ADULT - ALLERGIC/IMMUNOLOGIC
How Severe Is Your Lipoma?: moderate Is This A New Presentation, Or A Follow-Up?: Follow Up Lipoma negative

## 2020-02-21 NOTE — ASU PATIENT PROFILE, ADULT - PMH
Anxiety and depression    Class 1 obesity with body mass index (BMI) of 31.0 to 31.9 in adult    GERD (gastroesophageal reflux disease)    HTN (hypertension)    Hyperlipidemia, unspecified hyperlipidemia type    Knee injury  left  Language barrier  Bengali speaking  Varicose veins

## 2020-02-21 NOTE — H&P PST ADULT - NSICDXPASTMEDICALHX_GEN_ALL_CORE_FT
PAST MEDICAL HISTORY:  Anxiety and depression     Class 1 obesity with body mass index (BMI) of 31.0 to 31.9 in adult     GERD (gastroesophageal reflux disease)     HTN (hypertension)     Hyperlipidemia, unspecified hyperlipidemia type     Knee injury left    Language barrier Guyanese speaking    Varicose veins

## 2020-02-21 NOTE — H&P PST ADULT - MUSCULOSKELETAL
details… detailed exam no joint erythema/no joint warmth/no calf tenderness/decreased ROM due to pain/joint swelling
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
## 2020-02-23 ENCOUNTER — TRANSCRIPTION ENCOUNTER (OUTPATIENT)
Age: 70
End: 2020-02-23

## 2020-02-24 ENCOUNTER — OUTPATIENT (OUTPATIENT)
Dept: OUTPATIENT SERVICES | Facility: HOSPITAL | Age: 70
LOS: 1 days | Discharge: HOME | End: 2020-02-24
Payer: COMMERCIAL

## 2020-02-24 ENCOUNTER — RESULT REVIEW (OUTPATIENT)
Age: 70
End: 2020-02-24

## 2020-02-24 VITALS
DIASTOLIC BLOOD PRESSURE: 82 MMHG | HEART RATE: 77 BPM | RESPIRATION RATE: 15 BRPM | OXYGEN SATURATION: 97 % | SYSTOLIC BLOOD PRESSURE: 141 MMHG

## 2020-02-24 VITALS
OXYGEN SATURATION: 98 % | WEIGHT: 187.17 LBS | TEMPERATURE: 99 F | RESPIRATION RATE: 15 BRPM | SYSTOLIC BLOOD PRESSURE: 142 MMHG | DIASTOLIC BLOOD PRESSURE: 67 MMHG | HEART RATE: 65 BPM | HEIGHT: 65 IN

## 2020-02-24 DIAGNOSIS — Z98.890 OTHER SPECIFIED POSTPROCEDURAL STATES: Chronic | ICD-10-CM

## 2020-02-24 DIAGNOSIS — Z90.710 ACQUIRED ABSENCE OF BOTH CERVIX AND UTERUS: Chronic | ICD-10-CM

## 2020-02-24 DIAGNOSIS — Z98.49 CATARACT EXTRACTION STATUS, UNSPECIFIED EYE: Chronic | ICD-10-CM

## 2020-02-24 DIAGNOSIS — M22.42 CHONDROMALACIA PATELLAE, LEFT KNEE: ICD-10-CM

## 2020-02-24 DIAGNOSIS — S83.242A OTHER TEAR OF MEDIAL MENISCUS, CURRENT INJURY, LEFT KNEE, INITIAL ENCOUNTER: ICD-10-CM

## 2020-02-24 PROCEDURE — 20610 DRAIN/INJ JOINT/BURSA W/O US: CPT | Mod: RT

## 2020-02-24 PROCEDURE — 88304 TISSUE EXAM BY PATHOLOGIST: CPT | Mod: 26

## 2020-02-24 PROCEDURE — 20552 NJX 1/MLT TRIGGER POINT 1/2: CPT

## 2020-02-24 PROCEDURE — 88304 TISSUE EXAM BY PATHOLOGIST: CPT

## 2020-02-24 PROCEDURE — 29879 ARTHRS KNE SRG ABRASJ ARTHRP: CPT | Mod: LT

## 2020-02-24 PROCEDURE — 97161 PT EVAL LOW COMPLEX 20 MIN: CPT

## 2020-02-24 PROCEDURE — 29880 ARTHRS KNE SRG MNISECTMY M&L: CPT | Mod: LT

## 2020-02-24 RX ORDER — FAMOTIDINE 10 MG/ML
1 INJECTION INTRAVENOUS
Qty: 0 | Refills: 0 | DISCHARGE

## 2020-02-24 RX ORDER — HYDROMORPHONE HYDROCHLORIDE 2 MG/ML
0.5 INJECTION INTRAMUSCULAR; INTRAVENOUS; SUBCUTANEOUS
Refills: 0 | Status: DISCONTINUED | OUTPATIENT
Start: 2020-02-24 | End: 2020-02-24

## 2020-02-24 RX ORDER — OMEGA-3 ACID ETHYL ESTERS 1 G
0 CAPSULE ORAL
Qty: 0 | Refills: 0 | DISCHARGE

## 2020-02-24 RX ORDER — ATORVASTATIN CALCIUM 80 MG/1
1 TABLET, FILM COATED ORAL
Qty: 0 | Refills: 0 | DISCHARGE

## 2020-02-24 RX ORDER — METOPROLOL TARTRATE 50 MG
1 TABLET ORAL
Qty: 0 | Refills: 0 | DISCHARGE

## 2020-02-24 RX ORDER — ERGOCALCIFEROL 1.25 MG/1
0 CAPSULE ORAL
Qty: 0 | Refills: 0 | DISCHARGE

## 2020-02-24 RX ORDER — FLUOXETINE HCL 10 MG
1 CAPSULE ORAL
Qty: 0 | Refills: 0 | DISCHARGE

## 2020-02-24 RX ORDER — CHLORHEXIDINE GLUCONATE 213 G/1000ML
1 SOLUTION TOPICAL ONCE
Refills: 0 | Status: COMPLETED | OUTPATIENT
Start: 2020-02-24 | End: 2020-02-24

## 2020-02-24 RX ORDER — LOSARTAN POTASSIUM 100 MG/1
0 TABLET, FILM COATED ORAL
Qty: 0 | Refills: 0 | DISCHARGE

## 2020-02-24 RX ORDER — ONDANSETRON 8 MG/1
4 TABLET, FILM COATED ORAL ONCE
Refills: 0 | Status: DISCONTINUED | OUTPATIENT
Start: 2020-02-24 | End: 2020-02-24

## 2020-02-24 RX ORDER — APREPITANT 80 MG/1
40 CAPSULE ORAL ONCE
Refills: 0 | Status: COMPLETED | OUTPATIENT
Start: 2020-02-24 | End: 2020-02-24

## 2020-02-24 RX ORDER — VANCOMYCIN HCL 1 G
1250 VIAL (EA) INTRAVENOUS ONCE
Refills: 0 | Status: COMPLETED | OUTPATIENT
Start: 2020-02-24 | End: 2020-02-24

## 2020-02-24 RX ORDER — ASPIRIN/CALCIUM CARB/MAGNESIUM 324 MG
1 TABLET ORAL
Qty: 0 | Refills: 0 | DISCHARGE

## 2020-02-24 RX ORDER — SODIUM CHLORIDE 9 MG/ML
1000 INJECTION, SOLUTION INTRAVENOUS
Refills: 0 | Status: DISCONTINUED | OUTPATIENT
Start: 2020-02-24 | End: 2020-02-24

## 2020-02-24 RX ADMIN — APREPITANT 40 MILLIGRAM(S): 80 CAPSULE ORAL at 09:06

## 2020-02-24 RX ADMIN — Medication 166.67 MILLIGRAM(S): at 09:32

## 2020-02-24 RX ADMIN — CHLORHEXIDINE GLUCONATE 1 APPLICATION(S): 213 SOLUTION TOPICAL at 09:07

## 2020-02-24 NOTE — ASU DISCHARGE PLAN (ADULT/PEDIATRIC) - CARE PROVIDER_API CALL
Gold Upton)  Orthopaedic Surgery  [address] [place] [address]  Phone: [phone]  Fax: [phone]  Follow Up Time:

## 2020-02-24 NOTE — ASU DISCHARGE PLAN (ADULT/PEDIATRIC) - CALL YOUR DOCTOR IF YOU HAVE ANY OF THE FOLLOWING:
Pain not relieved by Medications/Numbness, tingling, color or temperature change to extremity/Nausea and vomiting that does not stop/Bleeding that does not stop/Swelling that gets worse/Fever greater than (need to indicate Fahrenheit or Celsius)/Wound/Surgical Site with redness, or foul smelling discharge or pus

## 2020-02-24 NOTE — BRIEF OPERATIVE NOTE - NSICDXBRIEFPROCEDURE_GEN_ALL_CORE_FT
PROCEDURES:  Injection of right shoulder 24-Feb-2020 13:07:01  Hardy Dos Santos  Arthroscopy of knee 24-Feb-2020 10:40:57  Hardy Dos Santos

## 2020-02-24 NOTE — BRIEF OPERATIVE NOTE - NSICDXBRIEFPOSTOP_GEN_ALL_CORE_FT
POST-OP DIAGNOSIS:  Bursitis of right shoulder 24-Feb-2020 13:08:07  Hardy Dos Santos  Tear meniscus knee 24-Feb-2020 13:07:38 left Hardy Dos Santos

## 2020-02-24 NOTE — BRIEF OPERATIVE NOTE - OPERATION/FINDINGS
see operative report  meniscus tear left knee  right shoulder bursitis Bipolar 1 disorder Bipolar 1 disorder Bipolar 1 disorder Bipolar 1 disorder Bipolar 1 disorder Bipolar 1 disorder Bipolar 1 disorder

## 2020-02-24 NOTE — PHYSICAL THERAPY INITIAL EVALUATION ADULT - ADDITIONAL COMMENTS
pt. lives in condominium with no stairs to enter (they use elevator) and also no stairs inside. pt. lives with  who will be around to assist. pt.  stated that prior to being in the hospital she required occasional assistance with ADL's around the home.

## 2020-02-24 NOTE — PHYSICAL THERAPY INITIAL EVALUATION ADULT - DID THE PATIENT HAVE SURGERY?
yes/s/p arthroscopic left knee partial medial meniscectomy chondroplasty microfracture drilling (2/24)

## 2020-02-24 NOTE — BRIEF OPERATIVE NOTE - NSICDXBRIEFPREOP_GEN_ALL_CORE_FT
PRE-OP DIAGNOSIS:  Bursitis of right shoulder 24-Feb-2020 13:07:17  Hardy Dos Santos  Torn meniscus 24-Feb-2020 10:40:35 left knee Hardy Dos Santos

## 2020-02-24 NOTE — ASU DISCHARGE PLAN (ADULT/PEDIATRIC) - ASU DC SPECIAL INSTRUCTIONSFT
rest  ice  elevate   wiggle toes  Physical Therapy as per Dr. Upton  Follow up Primary Care MD following discharge  Narcotic pain medications as needed rest  ice  elevate   wiggle toes  Physical Therapy as per Dr. Upton  Follow up Primary Care MD following discharge  Narcotic pain medications as needed  DO NOT make important decisions, drink alcohol, or operate machinery until after the first 24 hours and/or while on pain medication.  DO NOT take aspirin or ibuprofen any sooner than 24 hours after discharge from hospital. Flex and extend the knee 200 x a day STANDING.  Physical Therapy 5x/1week, then 4x/2 weeks, then 2x/3 weeks.  Follow instructions on prescriptions given, and resume regular meds.  Follow-up with Medical Doctor in 1-3 days to review home medications. rest  ice left knee / right shoulder   elevate   wiggle toes  Physical Therapy as per Dr. Upton  Follow up Primary Care MD following discharge  Narcotic pain medications as needed  DO NOT make important decisions, drink alcohol, or operate machinery until after the first 24 hours and/or while on pain medication.  DO NOT take aspirin or ibuprofen any sooner than 24 hours after discharge from hospital. Flex and extend the knee 200 x a day STANDING.  Physical Therapy 5x/1week, then 4x/2 weeks, then 2x/3 weeks.  Follow instructions on prescriptions given, and resume regular meds.  Follow-up with Medical Doctor in 1-3 days to review home medications.

## 2020-02-29 ENCOUNTER — OUTPATIENT (OUTPATIENT)
Dept: OUTPATIENT SERVICES | Facility: HOSPITAL | Age: 70
LOS: 1 days | End: 2020-02-29
Payer: COMMERCIAL

## 2020-02-29 ENCOUNTER — APPOINTMENT (OUTPATIENT)
Dept: MAMMOGRAPHY | Facility: CLINIC | Age: 70
End: 2020-02-29
Payer: MEDICARE

## 2020-02-29 DIAGNOSIS — Z98.49 CATARACT EXTRACTION STATUS, UNSPECIFIED EYE: Chronic | ICD-10-CM

## 2020-02-29 DIAGNOSIS — Z90.710 ACQUIRED ABSENCE OF BOTH CERVIX AND UTERUS: Chronic | ICD-10-CM

## 2020-02-29 DIAGNOSIS — Z98.890 OTHER SPECIFIED POSTPROCEDURAL STATES: Chronic | ICD-10-CM

## 2020-02-29 DIAGNOSIS — Z12.31 ENCOUNTER FOR SCREENING MAMMOGRAM FOR MALIGNANT NEOPLASM OF BREAST: ICD-10-CM

## 2020-02-29 PROBLEM — S89.90XA UNSPECIFIED INJURY OF UNSPECIFIED LOWER LEG, INITIAL ENCOUNTER: Chronic | Status: ACTIVE | Noted: 2020-02-21

## 2020-02-29 PROBLEM — E66.9 OBESITY, UNSPECIFIED: Chronic | Status: ACTIVE | Noted: 2020-02-21

## 2020-02-29 PROCEDURE — 77063 BREAST TOMOSYNTHESIS BI: CPT | Mod: 26

## 2020-02-29 PROCEDURE — 77063 BREAST TOMOSYNTHESIS BI: CPT

## 2020-02-29 PROCEDURE — 77067 SCR MAMMO BI INCL CAD: CPT | Mod: 26

## 2020-02-29 PROCEDURE — 77067 SCR MAMMO BI INCL CAD: CPT

## 2020-05-18 NOTE — ASU PREOP CHECKLIST - WARM FLUIDS/WARM BLANKETS
[FreeTextEntry1] : Patient is here for Hx of positive serology. REpeat serologies done at last visit revealed the exact same titers. He reports a transient 2 day history of right ankle swelling and tenderness in early March, but this resolved spontaneously. No other progression of symptoms.   no

## 2020-11-30 ENCOUNTER — APPOINTMENT (OUTPATIENT)
Dept: DISASTER EMERGENCY | Facility: CLINIC | Age: 70
End: 2020-11-30

## 2020-11-30 DIAGNOSIS — Z01.818 ENCOUNTER FOR OTHER PREPROCEDURAL EXAMINATION: ICD-10-CM

## 2020-12-01 LAB — SARS-COV-2 N GENE NPH QL NAA+PROBE: NOT DETECTED

## 2020-12-02 ENCOUNTER — TRANSCRIPTION ENCOUNTER (OUTPATIENT)
Age: 70
End: 2020-12-02

## 2020-12-03 ENCOUNTER — OUTPATIENT (OUTPATIENT)
Dept: OUTPATIENT SERVICES | Facility: HOSPITAL | Age: 70
LOS: 1 days | End: 2020-12-03
Payer: COMMERCIAL

## 2020-12-03 DIAGNOSIS — Z98.49 CATARACT EXTRACTION STATUS, UNSPECIFIED EYE: Chronic | ICD-10-CM

## 2020-12-03 DIAGNOSIS — Z98.890 OTHER SPECIFIED POSTPROCEDURAL STATES: Chronic | ICD-10-CM

## 2020-12-03 DIAGNOSIS — M54.16 RADICULOPATHY, LUMBAR REGION: ICD-10-CM

## 2020-12-03 DIAGNOSIS — Z90.710 ACQUIRED ABSENCE OF BOTH CERVIX AND UTERUS: Chronic | ICD-10-CM

## 2020-12-03 PROCEDURE — 62323 NJX INTERLAMINAR LMBR/SAC: CPT

## 2020-12-03 PROCEDURE — 76000 FLUOROSCOPY <1 HR PHYS/QHP: CPT

## 2021-01-11 ENCOUNTER — APPOINTMENT (OUTPATIENT)
Dept: DISASTER EMERGENCY | Facility: CLINIC | Age: 71
End: 2021-01-11

## 2021-01-12 LAB — SARS-COV-2 N GENE NPH QL NAA+PROBE: NOT DETECTED

## 2021-01-13 ENCOUNTER — TRANSCRIPTION ENCOUNTER (OUTPATIENT)
Age: 71
End: 2021-01-13

## 2021-01-14 ENCOUNTER — OUTPATIENT (OUTPATIENT)
Dept: OUTPATIENT SERVICES | Facility: HOSPITAL | Age: 71
LOS: 1 days | End: 2021-01-14
Payer: COMMERCIAL

## 2021-01-14 DIAGNOSIS — Z98.890 OTHER SPECIFIED POSTPROCEDURAL STATES: Chronic | ICD-10-CM

## 2021-01-14 DIAGNOSIS — Z90.710 ACQUIRED ABSENCE OF BOTH CERVIX AND UTERUS: Chronic | ICD-10-CM

## 2021-01-14 DIAGNOSIS — Z98.49 CATARACT EXTRACTION STATUS, UNSPECIFIED EYE: Chronic | ICD-10-CM

## 2021-01-14 DIAGNOSIS — M54.16 RADICULOPATHY, LUMBAR REGION: ICD-10-CM

## 2021-01-14 PROCEDURE — 77003 FLUOROGUIDE FOR SPINE INJECT: CPT

## 2021-01-14 PROCEDURE — 62323 NJX INTERLAMINAR LMBR/SAC: CPT

## 2021-04-05 NOTE — H&P PST ADULT - VENOUS THROMBOEMBOLISM BMI
[facility]                                              Cardiology Consult  Lino Harrell 66 y o  male   YOB: 1945 MRN: [id number]  Unit/Bed#: -[facility] Encounter: [id number]      Physician Requesting Consult: Margarito Peña [facility] )     Diabetes mellitus ([facility] )     1) obesity (BMI greater than 25)

## 2021-06-25 ENCOUNTER — APPOINTMENT (OUTPATIENT)
Dept: MAMMOGRAPHY | Facility: CLINIC | Age: 71
End: 2021-06-25
Payer: MEDICARE

## 2021-06-25 ENCOUNTER — OUTPATIENT (OUTPATIENT)
Dept: OUTPATIENT SERVICES | Facility: HOSPITAL | Age: 71
LOS: 1 days | End: 2021-06-25
Payer: MEDICARE

## 2021-06-25 DIAGNOSIS — Z98.890 OTHER SPECIFIED POSTPROCEDURAL STATES: Chronic | ICD-10-CM

## 2021-06-25 DIAGNOSIS — Z12.31 ENCOUNTER FOR SCREENING MAMMOGRAM FOR MALIGNANT NEOPLASM OF BREAST: ICD-10-CM

## 2021-06-25 DIAGNOSIS — Z98.49 CATARACT EXTRACTION STATUS, UNSPECIFIED EYE: Chronic | ICD-10-CM

## 2021-06-25 DIAGNOSIS — Z90.710 ACQUIRED ABSENCE OF BOTH CERVIX AND UTERUS: Chronic | ICD-10-CM

## 2021-06-25 PROCEDURE — 77067 SCR MAMMO BI INCL CAD: CPT | Mod: 26

## 2021-06-25 PROCEDURE — 77063 BREAST TOMOSYNTHESIS BI: CPT

## 2021-06-25 PROCEDURE — 77067 SCR MAMMO BI INCL CAD: CPT

## 2021-06-25 PROCEDURE — 77063 BREAST TOMOSYNTHESIS BI: CPT | Mod: 26
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
## 2021-11-22 ENCOUNTER — APPOINTMENT (OUTPATIENT)
Dept: GASTROENTEROLOGY | Facility: CLINIC | Age: 71
End: 2021-11-22
Payer: MEDICARE

## 2021-11-22 VITALS
HEIGHT: 65 IN | BODY MASS INDEX: 31.32 KG/M2 | WEIGHT: 188 LBS | HEART RATE: 70 BPM | TEMPERATURE: 98 F | SYSTOLIC BLOOD PRESSURE: 130 MMHG | DIASTOLIC BLOOD PRESSURE: 80 MMHG | OXYGEN SATURATION: 96 % | RESPIRATION RATE: 12 BRPM

## 2021-11-22 DIAGNOSIS — R10.9 UNSPECIFIED ABDOMINAL PAIN: ICD-10-CM

## 2021-11-22 DIAGNOSIS — K59.09 OTHER CONSTIPATION: ICD-10-CM

## 2021-11-22 DIAGNOSIS — Z86.010 PERSONAL HISTORY OF COLONIC POLYPS: ICD-10-CM

## 2021-11-22 DIAGNOSIS — Z12.11 ENCOUNTER FOR SCREENING FOR MALIGNANT NEOPLASM OF COLON: ICD-10-CM

## 2021-11-22 PROCEDURE — 99204 OFFICE O/P NEW MOD 45 MIN: CPT

## 2021-11-22 RX ORDER — LOSARTAN POTASSIUM 100 MG/1
100 TABLET, FILM COATED ORAL
Qty: 90 | Refills: 0 | Status: ACTIVE | COMMUNITY
Start: 2021-10-29

## 2021-11-22 RX ORDER — SULFAMETHOXAZOLE AND TRIMETHOPRIM 800; 160 MG/1; MG/1
800-160 TABLET ORAL
Qty: 10 | Refills: 0 | Status: DISCONTINUED | COMMUNITY
Start: 2021-08-27 | End: 2021-11-22

## 2021-11-22 RX ORDER — METOPROLOL SUCCINATE 25 MG/1
25 TABLET, EXTENDED RELEASE ORAL
Qty: 90 | Refills: 0 | Status: ACTIVE | COMMUNITY
Start: 2021-10-29

## 2021-11-22 RX ORDER — POLYETHYLENE GLYOCOL 3350, SODIUM CHLORIDE, SODIUM BICARBONATE AND POTASSIUM CHLORIDE 420; 11.2; 5.72; 1.48 G/4L; G/4L; G/4L; G/4L
420 POWDER, FOR SOLUTION NASOGASTRIC; ORAL
Qty: 1 | Refills: 0 | Status: DISCONTINUED | COMMUNITY
Start: 2018-01-02 | End: 2021-11-22

## 2021-11-22 RX ORDER — HYDROCHLOROTHIAZIDE 12.5 MG/1
12.5 TABLET ORAL
Qty: 90 | Refills: 0 | Status: ACTIVE | COMMUNITY
Start: 2020-11-16

## 2021-11-22 RX ORDER — LINACLOTIDE 145 UG/1
145 CAPSULE, GELATIN COATED ORAL
Qty: 90 | Refills: 3 | Status: ACTIVE | COMMUNITY
Start: 2021-11-22 | End: 1900-01-01

## 2021-11-22 RX ORDER — CIPROFLOXACIN HYDROCHLORIDE 500 MG/1
500 TABLET, FILM COATED ORAL
Qty: 14 | Refills: 0 | Status: DISCONTINUED | COMMUNITY
Start: 2021-10-29 | End: 2021-11-22

## 2021-11-22 NOTE — ADDENDUM
[FreeTextEntry1] : I, Shanon Fish PA-C, acted as a scribe for the services dictated to me by KEVYN Portillo in this document on Nov 22, 2021 for TAWNYA ALARCON .\par

## 2021-11-22 NOTE — PHYSICAL EXAM
[General Appearance - Alert] : alert [General Appearance - In No Acute Distress] : in no acute distress [Sclera] : the sclera and conjunctiva were normal [PERRL With Normal Accommodation] : pupils were equal in size, round, and reactive to light [Extraocular Movements] : extraocular movements were intact [Outer Ear] : the ears and nose were normal in appearance [Oropharynx] : the oropharynx was normal [Neck Appearance] : the appearance of the neck was normal [Auscultation Breath Sounds / Voice Sounds] : lungs were clear to auscultation bilaterally [Heart Rate And Rhythm] : heart rate was normal and rhythm regular [Heart Sounds] : normal S1 and S2 [Heart Sounds Gallop] : no gallops [Murmurs] : no murmurs [Heart Sounds Pericardial Friction Rub] : no pericardial rub [Bowel Sounds] : normal bowel sounds [Abdomen Soft] : soft [] : no hepato-splenomegaly [Abdomen Mass (___ Cm)] : no abdominal mass palpated [Epigastric] : in the epigastric area [No Focal Deficits] : no focal deficits [Oriented To Time, Place, And Person] : oriented to person, place, and time [Impaired Insight] : insight and judgment were intact [Affect] : the affect was normal

## 2021-11-22 NOTE — ASSESSMENT
[FreeTextEntry1] : Patient is a 71 year female, with PMH of recurrent UTIs, chronic GERD, constipation, personal h/o colon polyps, HLD, depression, HTN, who presents for evaluation of epigastric pain and LLQ pain x 3 months. \par \par Epigastric pain and GERD\par Lifestyle modifications discussed:\par - Decreased intake of chocolate, caffeine, alcohol, carbonated beverages, fried foods, spicy foods, and citrus foods including, but not limited to, oranges, tomatoes and grapefruit \par - Elevated head of bed at bedtime\par - Avoid eating 2-3 hours prior to laying down/sleep\par - Consider eating small, frequent meals\par - Weight loss \par \par EGD to be scheduled to r/o gastritis, esophagitis, Worley's Esophagus, or PUD. Indication, risks and benefit of EGD discussed with patient in detail. All questions answered. Patient is medically optimized for EGD.\par \par Chronic constipation and personal h/o colon polyps\par Colonoscopy to be scheduled. I have discussed the indications, risks and benefits of procedure with patient. Risks include, but not limited to, bleeding, perforation, infection, and reaction to anesthesia. Alternatives to colonoscopy discussed with patient. Patient was given the opportunity to ask questions, all questions were answered. The patient agrees to proceed with colonoscopy. Patient is medically optimized for colonoscopy. \par \par Gavilyte prep to be used. Bowel prep instructions discussed at length. \par \par Labs done 1 month ago with PCP - will retrieve a copy \par \par I evaluated this patient with Shanon and agree with the above assessment and management plan for repeat upper endoscopy for further evaluation of her worsening reflux and upper GI symptoms and repeat colonoscopy for continued colon polyp surveillance both of which will be done on the same day with preprocedure lab work as noted above.  Linzess 145 mcg once daily was prescribed for treatment of her constipation pending repeat colonoscopy.  Her ASA classification is 2 and she appears to be medically optimized for the proposed EGD and colonoscopy.

## 2021-11-22 NOTE — HISTORY OF PRESENT ILLNESS
[de-identified] : Patient is a 71 year female, with PMH of recurrent UTIs, chronic GERD, constipation, personal h/o colon polyps, HLD, depression, HTN, who presents for evaluation of epigastric pain and LLQ pain x 3 months. \par \par Pt states epigastric pain is almost daily with associated acid reflux, heartburn and dysphagia once/week with solids, nausea without vomiting. She takes Omeprazole 40mg PO QAM and Famotidine 20mg QHS with some relief. EGD done in 2018 was essentially normal, neg HP. \par \par Pt also c/o chronic constipation, with intermittent LLQ pain that comes and goes x 2-3 months - worse with constipation and straining. Weight stable, negative rectal bleeding. Colonoscopy in 2018 showed a small tubular adenoma. \par \par Patient denies any significant cardiac or pulmonary conditions. \par \par Patient denies rectal bleeding, vomiting, or unexplained weight loss.

## 2021-11-22 NOTE — REASON FOR VISIT
[Consultation] : a consultation visit [[employer] ] : provided by [employer]   [Time Spent: ____ minutes] : Total time spent using  services: [unfilled] minutes. The patient's primary language is not English thus required  services. [TWNoteComboBox1] : Tristanian

## 2021-12-03 ENCOUNTER — APPOINTMENT (OUTPATIENT)
Dept: OBGYN | Facility: CLINIC | Age: 71
End: 2021-12-03

## 2021-12-21 NOTE — H&P PST ADULT - NEUROLOGICAL
negative Alert & oriented; no sensory, motor or coordination deficits, normal reflexes Cephalexin Counseling: I counseled the patient regarding use of cephalexin as an antibiotic for prophylactic and/or therapeutic purposes. Cephalexin (commonly prescribed under brand name Keflex) is a cephalosporin antibiotic which is active against numerous classes of bacteria, including most skin bacteria. Side effects may include nausea, diarrhea, gastrointestinal upset, rash, hives, yeast infections, and in rare cases, hepatitis, kidney disease, seizures, fever, confusion, neurologic symptoms, and others. Patients with severe allergies to penicillin medications are cautioned that there is about a 10% incidence of cross-reactivity with cephalosporins. When possible, patients with penicillin allergies should use alternatives to cephalosporins for antibiotic therapy.

## 2022-01-20 ENCOUNTER — APPOINTMENT (OUTPATIENT)
Dept: GASTROENTEROLOGY | Facility: GI CENTER | Age: 72
End: 2022-01-20

## 2022-04-14 ENCOUNTER — TRANSCRIPTION ENCOUNTER (OUTPATIENT)
Age: 72
End: 2022-04-14

## 2022-04-15 ENCOUNTER — RESULT REVIEW (OUTPATIENT)
Age: 72
End: 2022-04-15

## 2022-04-15 ENCOUNTER — APPOINTMENT (OUTPATIENT)
Dept: GASTROENTEROLOGY | Facility: GI CENTER | Age: 72
End: 2022-04-15
Payer: MEDICARE

## 2022-04-15 ENCOUNTER — OUTPATIENT (OUTPATIENT)
Dept: OUTPATIENT SERVICES | Facility: HOSPITAL | Age: 72
LOS: 1 days | End: 2022-04-15
Payer: MEDICARE

## 2022-04-15 DIAGNOSIS — Z98.890 OTHER SPECIFIED POSTPROCEDURAL STATES: Chronic | ICD-10-CM

## 2022-04-15 DIAGNOSIS — Z98.49 CATARACT EXTRACTION STATUS, UNSPECIFIED EYE: Chronic | ICD-10-CM

## 2022-04-15 DIAGNOSIS — R10.13 EPIGASTRIC PAIN: ICD-10-CM

## 2022-04-15 DIAGNOSIS — Z90.710 ACQUIRED ABSENCE OF BOTH CERVIX AND UTERUS: Chronic | ICD-10-CM

## 2022-04-15 DIAGNOSIS — K57.30 DIVERTICULOSIS OF LARGE INTESTINE W/OUT PERFORATION OR ABSCESS W/OUT BLEEDING: ICD-10-CM

## 2022-04-15 DIAGNOSIS — Z86.010 PERSONAL HISTORY OF COLONIC POLYPS: ICD-10-CM

## 2022-04-15 DIAGNOSIS — K59.09 OTHER CONSTIPATION: ICD-10-CM

## 2022-04-15 DIAGNOSIS — K64.8 OTHER HEMORRHOIDS: ICD-10-CM

## 2022-04-15 DIAGNOSIS — K29.60 OTHER GASTRITIS W/OUT BLEEDING: ICD-10-CM

## 2022-04-15 PROCEDURE — 88305 TISSUE EXAM BY PATHOLOGIST: CPT

## 2022-04-15 PROCEDURE — 43239 EGD BIOPSY SINGLE/MULTIPLE: CPT

## 2022-04-15 PROCEDURE — 88342 IMHCHEM/IMCYTCHM 1ST ANTB: CPT | Mod: 26

## 2022-04-15 PROCEDURE — 88342 IMHCHEM/IMCYTCHM 1ST ANTB: CPT

## 2022-04-15 PROCEDURE — G0105: CPT

## 2022-04-15 PROCEDURE — 88305 TISSUE EXAM BY PATHOLOGIST: CPT | Mod: 26

## 2022-04-15 PROCEDURE — 45378 DIAGNOSTIC COLONOSCOPY: CPT | Mod: 59,PT

## 2022-04-15 RX ORDER — OMEPRAZOLE 40 MG/1
40 CAPSULE, DELAYED RELEASE ORAL
Qty: 30 | Refills: 5 | Status: ACTIVE | COMMUNITY

## 2022-04-15 NOTE — PHYSICAL EXAM
[General Appearance - Alert] : alert [General Appearance - In No Acute Distress] : in no acute distress [General Appearance - Well Nourished] : well nourished [General Appearance - Well Developed] : well developed [General Appearance - Well-Appearing] : healthy appearing [Sclera] : the sclera and conjunctiva were normal [PERRL With Normal Accommodation] : pupils were equal in size, round, and reactive to light [Extraocular Movements] : extraocular movements were intact [Outer Ear] : the ears and nose were normal in appearance [Examination Of The Oral Cavity] : the lips and gums were normal [Oropharynx] : the oropharynx was normal [Neck Appearance] : the appearance of the neck was normal [Jugular Venous Distention Increased] : there was no jugular-venous distention [Neck Cervical Mass (___cm)] : no neck mass was observed [Thyroid Diffuse Enlargement] : the thyroid was not enlarged [Thyroid Nodule] : there were no palpable thyroid nodules [Auscultation Breath Sounds / Voice Sounds] : lungs were clear to auscultation bilaterally [Heart Rate And Rhythm] : heart rate was normal and rhythm regular [Heart Sounds] : normal S1 and S2 [Heart Sounds Gallop] : no gallops [Murmurs] : no murmurs [Heart Sounds Pericardial Friction Rub] : no pericardial rub [Bowel Sounds] : normal bowel sounds [Abdomen Soft] : soft [] : no hepato-splenomegaly [Abdomen Mass (___ Cm)] : no abdominal mass palpated [Epigastric] : in the epigastric area [Normal Sphincter Tone] : normal sphincter tone [No Rectal Mass] : no rectal mass [No CVA Tenderness] : no ~M costovertebral angle tenderness [Abnormal Walk] : normal gait [Musculoskeletal - Swelling] : no joint swelling seen [Skin Color & Pigmentation] : normal skin color and pigmentation [Skin Turgor] : normal skin turgor [Oriented To Time, Place, And Person] : oriented to person, place, and time [Periumbilical] : not in the periumbilical area [Suprapubic] : not in the suprapubic area [RUQ] : not in the in the right upper quadrant [RLQ] : not in the right lower quadrant [LUQ] : not in the left upper quadrant [LLQ] : not in the left lower quadrant [Internal Hemorrhoid] : no internal hemorrhoids [External Hemorrhoid] : no external hemorrhoids [Occult Blood Positive] : stool was negative for occult blood [FreeTextEntry1] : the exam was chaperoned by Izabel

## 2022-04-15 NOTE — REASON FOR VISIT
[Procedure: _________] : a [unfilled] procedure visit [FreeTextEntry1] : Epigastric pain and h/o colon polyps

## 2022-04-15 NOTE — REVIEW OF SYSTEMS
[Abdominal Pain] : abdominal pain [Heartburn] : heartburn [Negative] : Heme/Lymph [Vomiting] : no vomiting [Constipation] : no constipation [Diarrhea] : no diarrhea [Melena] : no melena

## 2022-04-20 LAB — SURGICAL PATHOLOGY STUDY: SIGNIFICANT CHANGE UP

## 2022-05-24 NOTE — ED ADULT TRIAGE NOTE - WEIGHT IN KG
Last seen 11/09/2020, last filled 04/25/2022.  Next appt scheduled 06/03/2022.    Ok to refill?    
OK .  Did not give additional refills until he is seen in June.  
Pharmacy Requesting a refill.    Medication(s) for Jesus Zarate submitted for a refill request and is pending approval from the Provider.    Caller has been advised that their call does not guarantee an immediate refill. This refill will be reviewed within 24-72 hours by a qualified provider who will determine whether he or she can refill the medication.    Patient has contacted the pharmacy?  Pharmacy itself calling    Ysabel Response: N/A    Call Back Number: [phone]    Can a detailed message be left? Yes_No_---: No    Additional information:     Auto Refill due but needs Authorization new Rx to be sent    Patient’s preferred pharmacy has been noted and populated.         [address] [address]  Phone: [phone] Fax: [phone]    
88

## 2022-10-12 ENCOUNTER — TRANSCRIPTION ENCOUNTER (OUTPATIENT)
Age: 72
End: 2022-10-12

## 2022-10-13 ENCOUNTER — OUTPATIENT (OUTPATIENT)
Dept: OUTPATIENT SERVICES | Facility: HOSPITAL | Age: 72
LOS: 1 days | End: 2022-10-13
Payer: MEDICARE

## 2022-10-13 DIAGNOSIS — Z98.890 OTHER SPECIFIED POSTPROCEDURAL STATES: Chronic | ICD-10-CM

## 2022-10-13 DIAGNOSIS — Z90.710 ACQUIRED ABSENCE OF BOTH CERVIX AND UTERUS: Chronic | ICD-10-CM

## 2022-10-13 DIAGNOSIS — Z98.49 CATARACT EXTRACTION STATUS, UNSPECIFIED EYE: Chronic | ICD-10-CM

## 2022-10-13 DIAGNOSIS — M46.1 SACROILIITIS, NOT ELSEWHERE CLASSIFIED: ICD-10-CM

## 2022-10-13 PROCEDURE — G0260: CPT | Mod: 50

## 2022-10-13 PROCEDURE — 76000 FLUOROSCOPY <1 HR PHYS/QHP: CPT

## 2022-12-21 ENCOUNTER — TRANSCRIPTION ENCOUNTER (OUTPATIENT)
Age: 72
End: 2022-12-21

## 2022-12-22 ENCOUNTER — OUTPATIENT (OUTPATIENT)
Dept: OUTPATIENT SERVICES | Facility: HOSPITAL | Age: 72
LOS: 1 days | End: 2022-12-22
Payer: MEDICARE

## 2022-12-22 DIAGNOSIS — Z98.890 OTHER SPECIFIED POSTPROCEDURAL STATES: Chronic | ICD-10-CM

## 2022-12-22 DIAGNOSIS — Z90.710 ACQUIRED ABSENCE OF BOTH CERVIX AND UTERUS: Chronic | ICD-10-CM

## 2022-12-22 DIAGNOSIS — M47.817 SPONDYLOSIS WITHOUT MYELOPATHY OR RADICULOPATHY, LUMBOSACRAL REGION: ICD-10-CM

## 2022-12-22 DIAGNOSIS — Z98.49 CATARACT EXTRACTION STATUS, UNSPECIFIED EYE: Chronic | ICD-10-CM

## 2022-12-22 PROCEDURE — 64494 INJ PARAVERT F JNT L/S 2 LEV: CPT | Mod: 50

## 2022-12-22 PROCEDURE — 76000 FLUOROSCOPY <1 HR PHYS/QHP: CPT

## 2022-12-22 PROCEDURE — 64493 INJ PARAVERT F JNT L/S 1 LEV: CPT | Mod: 50

## 2022-12-29 ENCOUNTER — EMERGENCY (EMERGENCY)
Facility: HOSPITAL | Age: 72
LOS: 1 days | Discharge: HOME | End: 2022-12-29
Attending: EMERGENCY MEDICINE
Payer: MEDICARE

## 2022-12-29 VITALS
DIASTOLIC BLOOD PRESSURE: 99 MMHG | SYSTOLIC BLOOD PRESSURE: 160 MMHG | TEMPERATURE: 99 F | HEART RATE: 130 BPM | OXYGEN SATURATION: 95 % | RESPIRATION RATE: 18 BRPM

## 2022-12-29 VITALS
RESPIRATION RATE: 18 BRPM | OXYGEN SATURATION: 95 % | HEART RATE: 97 BPM | SYSTOLIC BLOOD PRESSURE: 166 MMHG | DIASTOLIC BLOOD PRESSURE: 83 MMHG | TEMPERATURE: 99 F

## 2022-12-29 DIAGNOSIS — Z90.710 ACQUIRED ABSENCE OF BOTH CERVIX AND UTERUS: Chronic | ICD-10-CM

## 2022-12-29 DIAGNOSIS — Z98.890 OTHER SPECIFIED POSTPROCEDURAL STATES: Chronic | ICD-10-CM

## 2022-12-29 DIAGNOSIS — Z98.49 CATARACT EXTRACTION STATUS, UNSPECIFIED EYE: Chronic | ICD-10-CM

## 2022-12-29 LAB
ALBUMIN SERPL ELPH-MCNC: 3.4 G/DL — SIGNIFICANT CHANGE UP (ref 3.3–5.2)
ALP SERPL-CCNC: 81 U/L — SIGNIFICANT CHANGE UP (ref 40–120)
ALT FLD-CCNC: 39 U/L — HIGH
ANION GAP SERPL CALC-SCNC: 13 MMOL/L — SIGNIFICANT CHANGE UP (ref 5–17)
APTT BLD: 29.6 SEC — SIGNIFICANT CHANGE UP (ref 27.5–35.5)
AST SERPL-CCNC: 35 U/L — HIGH
B PERT DNA SPEC QL NAA+PROBE: SIGNIFICANT CHANGE UP
BASOPHILS # BLD AUTO: 0.03 K/UL — SIGNIFICANT CHANGE UP (ref 0–0.2)
BASOPHILS NFR BLD AUTO: 0.3 % — SIGNIFICANT CHANGE UP (ref 0–2)
BILIRUB SERPL-MCNC: 1 MG/DL — SIGNIFICANT CHANGE UP (ref 0.4–2)
BUN SERPL-MCNC: 19.5 MG/DL — SIGNIFICANT CHANGE UP (ref 8–20)
C PNEUM DNA SPEC QL NAA+PROBE: SIGNIFICANT CHANGE UP
CALCIUM SERPL-MCNC: 8.1 MG/DL — LOW (ref 8.4–10.5)
CHLORIDE SERPL-SCNC: 109 MMOL/L — HIGH (ref 96–108)
CO2 SERPL-SCNC: 18 MMOL/L — LOW (ref 22–29)
CREAT SERPL-MCNC: 0.96 MG/DL — SIGNIFICANT CHANGE UP (ref 0.5–1.3)
EGFR: 62 ML/MIN/1.73M2 — SIGNIFICANT CHANGE UP
EOSINOPHIL # BLD AUTO: 0.25 K/UL — SIGNIFICANT CHANGE UP (ref 0–0.5)
EOSINOPHIL NFR BLD AUTO: 2.5 % — SIGNIFICANT CHANGE UP (ref 0–6)
FLUAV H1 2009 PAND RNA SPEC QL NAA+PROBE: DETECTED
FLUAV H1 RNA SPEC QL NAA+PROBE: SIGNIFICANT CHANGE UP
FLUAV H3 RNA SPEC QL NAA+PROBE: SIGNIFICANT CHANGE UP
FLUAV SUBTYP SPEC NAA+PROBE: DETECTED
FLUBV RNA SPEC QL NAA+PROBE: SIGNIFICANT CHANGE UP
GLUCOSE SERPL-MCNC: 97 MG/DL — SIGNIFICANT CHANGE UP (ref 70–99)
HADV DNA SPEC QL NAA+PROBE: SIGNIFICANT CHANGE UP
HCOV PNL SPEC NAA+PROBE: SIGNIFICANT CHANGE UP
HCT VFR BLD CALC: 41.6 % — SIGNIFICANT CHANGE UP (ref 34.5–45)
HGB BLD-MCNC: 13.8 G/DL — SIGNIFICANT CHANGE UP (ref 11.5–15.5)
HMPV RNA SPEC QL NAA+PROBE: SIGNIFICANT CHANGE UP
HPIV1 RNA SPEC QL NAA+PROBE: SIGNIFICANT CHANGE UP
HPIV2 RNA SPEC QL NAA+PROBE: SIGNIFICANT CHANGE UP
HPIV3 RNA SPEC QL NAA+PROBE: SIGNIFICANT CHANGE UP
HPIV4 RNA SPEC QL NAA+PROBE: SIGNIFICANT CHANGE UP
IMM GRANULOCYTES NFR BLD AUTO: 0.4 % — SIGNIFICANT CHANGE UP (ref 0–0.9)
INR BLD: 1.1 RATIO — SIGNIFICANT CHANGE UP (ref 0.88–1.16)
LIDOCAIN IGE QN: 35 U/L — SIGNIFICANT CHANGE UP (ref 22–51)
LYMPHOCYTES # BLD AUTO: 1.08 K/UL — SIGNIFICANT CHANGE UP (ref 1–3.3)
LYMPHOCYTES # BLD AUTO: 11 % — LOW (ref 13–44)
MCHC RBC-ENTMCNC: 28.8 PG — SIGNIFICANT CHANGE UP (ref 27–34)
MCHC RBC-ENTMCNC: 33.2 GM/DL — SIGNIFICANT CHANGE UP (ref 32–36)
MCV RBC AUTO: 86.8 FL — SIGNIFICANT CHANGE UP (ref 80–100)
MONOCYTES # BLD AUTO: 1.32 K/UL — HIGH (ref 0–0.9)
MONOCYTES NFR BLD AUTO: 13.4 % — SIGNIFICANT CHANGE UP (ref 2–14)
NEUTROPHILS # BLD AUTO: 7.1 K/UL — SIGNIFICANT CHANGE UP (ref 1.8–7.4)
NEUTROPHILS NFR BLD AUTO: 72.4 % — SIGNIFICANT CHANGE UP (ref 43–77)
PLATELET # BLD AUTO: 274 K/UL — SIGNIFICANT CHANGE UP (ref 150–400)
POTASSIUM SERPL-MCNC: 4.2 MMOL/L — SIGNIFICANT CHANGE UP (ref 3.5–5.3)
POTASSIUM SERPL-SCNC: 4.2 MMOL/L — SIGNIFICANT CHANGE UP (ref 3.5–5.3)
PROT SERPL-MCNC: 6.2 G/DL — LOW (ref 6.6–8.7)
PROTHROM AB SERPL-ACNC: 12.8 SEC — SIGNIFICANT CHANGE UP (ref 10.5–13.4)
RAPID RVP RESULT: DETECTED
RBC # BLD: 4.79 M/UL — SIGNIFICANT CHANGE UP (ref 3.8–5.2)
RBC # FLD: 13.7 % — SIGNIFICANT CHANGE UP (ref 10.3–14.5)
RV+EV RNA SPEC QL NAA+PROBE: SIGNIFICANT CHANGE UP
SARS-COV-2 RNA SPEC QL NAA+PROBE: SIGNIFICANT CHANGE UP
SODIUM SERPL-SCNC: 140 MMOL/L — SIGNIFICANT CHANGE UP (ref 135–145)
TROPONIN T SERPL-MCNC: <0.01 NG/ML — SIGNIFICANT CHANGE UP (ref 0–0.06)
WBC # BLD: 9.82 K/UL — SIGNIFICANT CHANGE UP (ref 3.8–10.5)
WBC # FLD AUTO: 9.82 K/UL — SIGNIFICANT CHANGE UP (ref 3.8–10.5)

## 2022-12-29 PROCEDURE — 0225U NFCT DS DNA&RNA 21 SARSCOV2: CPT

## 2022-12-29 PROCEDURE — 71045 X-RAY EXAM CHEST 1 VIEW: CPT

## 2022-12-29 PROCEDURE — 85610 PROTHROMBIN TIME: CPT

## 2022-12-29 PROCEDURE — 80053 COMPREHEN METABOLIC PANEL: CPT

## 2022-12-29 PROCEDURE — 93005 ELECTROCARDIOGRAM TRACING: CPT

## 2022-12-29 PROCEDURE — 93010 ELECTROCARDIOGRAM REPORT: CPT

## 2022-12-29 PROCEDURE — 85025 COMPLETE CBC W/AUTO DIFF WBC: CPT

## 2022-12-29 PROCEDURE — 36415 COLL VENOUS BLD VENIPUNCTURE: CPT

## 2022-12-29 PROCEDURE — 83690 ASSAY OF LIPASE: CPT

## 2022-12-29 PROCEDURE — 96374 THER/PROPH/DIAG INJ IV PUSH: CPT

## 2022-12-29 PROCEDURE — 85730 THROMBOPLASTIN TIME PARTIAL: CPT

## 2022-12-29 PROCEDURE — 71045 X-RAY EXAM CHEST 1 VIEW: CPT | Mod: 26

## 2022-12-29 PROCEDURE — 84484 ASSAY OF TROPONIN QUANT: CPT

## 2022-12-29 PROCEDURE — 96375 TX/PRO/DX INJ NEW DRUG ADDON: CPT

## 2022-12-29 PROCEDURE — 99285 EMERGENCY DEPT VISIT HI MDM: CPT

## 2022-12-29 PROCEDURE — 96361 HYDRATE IV INFUSION ADD-ON: CPT

## 2022-12-29 PROCEDURE — 99285 EMERGENCY DEPT VISIT HI MDM: CPT | Mod: 25

## 2022-12-29 RX ORDER — SODIUM CHLORIDE 9 MG/ML
1000 INJECTION INTRAMUSCULAR; INTRAVENOUS; SUBCUTANEOUS ONCE
Refills: 0 | Status: COMPLETED | OUTPATIENT
Start: 2022-12-29 | End: 2022-12-29

## 2022-12-29 RX ORDER — KETOROLAC TROMETHAMINE 30 MG/ML
15 SYRINGE (ML) INJECTION ONCE
Refills: 0 | Status: DISCONTINUED | OUTPATIENT
Start: 2022-12-29 | End: 2022-12-29

## 2022-12-29 RX ORDER — ONDANSETRON 8 MG/1
4 TABLET, FILM COATED ORAL ONCE
Refills: 0 | Status: COMPLETED | OUTPATIENT
Start: 2022-12-29 | End: 2022-12-29

## 2022-12-29 RX ADMIN — Medication 15 MILLIGRAM(S): at 09:29

## 2022-12-29 RX ADMIN — Medication 15 MILLIGRAM(S): at 06:38

## 2022-12-29 RX ADMIN — SODIUM CHLORIDE 1000 MILLILITER(S): 9 INJECTION INTRAMUSCULAR; INTRAVENOUS; SUBCUTANEOUS at 07:29

## 2022-12-29 RX ADMIN — SODIUM CHLORIDE 1000 MILLILITER(S): 9 INJECTION INTRAMUSCULAR; INTRAVENOUS; SUBCUTANEOUS at 06:38

## 2022-12-29 RX ADMIN — ONDANSETRON 4 MILLIGRAM(S): 8 TABLET, FILM COATED ORAL at 06:38

## 2022-12-29 NOTE — ED PROVIDER NOTE - ATTENDING CONTRIBUTION TO CARE
Sky: I performed a face to face bedside interview with patient regarding history of present illness, review of symptoms and past medical history. I completed an independent physical exam.  I have discussed patient's plan of care with resident.   I agree with note as stated above including HISTORY OF PRESENT ILLNESS, HIV, PAST MEDICAL/SURGICAL/FAMILY/SOCIAL HISTORY, ALLERGIES AND HOME MEDICATIONS, REVIEW OF SYSTEMS, PHYSICAL EXAM, MEDICAL DECISION MAKING and any PROGRESS NOTES during the time I functioned as the attending physician for this patient unless otherwise noted. My brief assessment is as follows: 72-year-old female history of hypertension hyperlipidemia presents with myalgias, nasal congestion, subjective fever, cough and shortness of breath worsening over the last few days.  Daughter with similar symptoms.  Denies nausea vomiting diarrhea, chest pain.  likely viral illness, plan for labs symptom control chest x-ray IV fluids and reassess.

## 2022-12-29 NOTE — ED PROVIDER NOTE - PHYSICAL EXAMINATION
Gen: NAD.   Head: NC/AT  Neck: trachea midline  Resp:  No distress, lungs cta b/l.   Cardiac: heart rrr. No murmur.   Abdomen: soft, nontender, nondistended.   Ext: no deformities  Neuro:  A&O appears non focal  Skin:  Warm and dry as visualized  Psych:  Normal affect and mood

## 2022-12-29 NOTE — ED PROVIDER NOTE - PATIENT PORTAL LINK FT
You can access the FollowMyHealth Patient Portal offered by [facility] by registering at the following website: [email or web address]. By joining [facility]’s FollowMyHealth portal, you will also be able to view your health information using other applications (apps) compatible with our system.

## 2022-12-29 NOTE — ED PROVIDER NOTE - NSICDXPASTMEDICALHX_GEN_ALL_CORE_FT
PAST MEDICAL HISTORY:  Anxiety and depression     Class 1 obesity with body mass index (BMI) of 31.0 to 31.9 in adult     GERD (gastroesophageal reflux disease)     HTN (hypertension)     Hyperlipidemia, unspecified hyperlipidemia type     Knee injury left    Language barrier Liberian speaking    Varicose veins

## 2022-12-29 NOTE — ED ADULT NURSE NOTE - NSICDXPASTMEDICALHX_GEN_ALL_CORE_FT
PAST MEDICAL HISTORY:  Anxiety and depression     Class 1 obesity with body mass index (BMI) of 31.0 to 31.9 in adult     GERD (gastroesophageal reflux disease)     HTN (hypertension)     Hyperlipidemia, unspecified hyperlipidemia type     Knee injury left    Language barrier Brazilian speaking    Varicose veins

## 2022-12-29 NOTE — ED PROVIDER NOTE - OBJECTIVE STATEMENT
73 yo female history of GERD, HTN, HLD, Varicose Veins, Anxiety presents with body aches, sob over the past several days. She reports subjective fever as well. Patient endorses body aches, cough with sputum production. She denies chest pain, sore throat, abd pain, n/v/d, urinary complaints, neurologic complaints at this time.

## 2022-12-29 NOTE — ED ADULT NURSE NOTE - NSIMPLEMENTINTERV_GEN_ALL_ED
Implemented All Universal Safety Interventions:  [place] to call system. Call bell, personal items and telephone within reach. Instruct patient to call for assistance. Room bathroom lighting operational. Non-slip footwear when patient is off stretcher. Physically safe environment: no spills, clutter or unnecessary equipment. Stretcher in lowest position, wheels locked, appropriate side rails in place.

## 2022-12-29 NOTE — ED ADULT NURSE NOTE - OBJECTIVE STATEMENT
Pt. is a 71 yo F who p/w flu-like symptoms. PT. A&OX4 and amb. PT. endorsing 1 day SOB, cough, intermittent fevers, nausea and body aches. Resp. equal and unlabored b/l. Dry- non-productive cough heard. Sinus tach on cardiac monitor. Afebrile on arrival. Resp. equal and unlabored b/l. VSS. NAD. Comfort measures provided, safety measures implemented, call bell in reach. MD at bedside.

## 2022-12-29 NOTE — ED PROVIDER NOTE - NSFOLLOWUPINSTRUCTIONS_ED_ALL_ED_FT
Comuníquese con un proveedor de atención médica si:    •Usted desarrolla nuevos síntomas.    •Tú tienes:  •Dolor en el pecho.      •Diarrea.      •Fiebre.        •Pastor tos empeora.      •Produces más mucosidad.      • Siente náuseas o vomita.        Obtenga ayuda de inmediato si usted:    •Desarrolla dificultad para respirar o tiene dificultad para respirar.      •Tener la piel o las uñas que se tornan de un color azulado.      •Tiene dolor intenso o rigidez en el kim.      •Desarrolle un dolor de andre repentino o un dolor repentino en la lida o el oído.      •No puede comer ni beber sin vomitar.      Estos síntomas pueden representar un problema grave que es kathia emergencia. No espere a josé si los síntomas desaparecen. Obtenga ayuda médica de inmediato. Llame a los servicios de emergencia locales (911 en los [place] No conduzca usted mismo al hospital.

## 2022-12-29 NOTE — ED PROVIDER NOTE - CLINICAL SUMMARY MEDICAL DECISION MAKING FREE TEXT BOX
71 yo female presents with SOB, body aches intermittent fevers over past few days. Likely viral illness. Will obtain labs, ekg, cxr. IV medications for symptom management along with IVF. Monitor and reassess.

## 2022-12-29 NOTE — ED ADULT TRIAGE NOTE - CHIEF COMPLAINT QUOTE
BIBEMS from home for flu-like symptoms consisting of cough, chills, sore throat and SOB. States that she has been sick for several days and it has become worse today. Denies chest pain. Unknown sick contacts. EKG done in triage.

## 2023-01-03 ENCOUNTER — OFFICE (OUTPATIENT)
Dept: URBAN - METROPOLITAN AREA CLINIC 94 | Facility: CLINIC | Age: 73
Setting detail: OPHTHALMOLOGY
End: 2023-01-03
Payer: COMMERCIAL

## 2023-01-03 ENCOUNTER — RX ONLY (RX ONLY)
Age: 73
End: 2023-01-03

## 2023-01-03 DIAGNOSIS — H04.123: ICD-10-CM

## 2023-01-03 DIAGNOSIS — H04.121: ICD-10-CM

## 2023-01-03 DIAGNOSIS — H00.12: ICD-10-CM

## 2023-01-03 DIAGNOSIS — H00.11: ICD-10-CM

## 2023-01-03 DIAGNOSIS — H02.132: ICD-10-CM

## 2023-01-03 DIAGNOSIS — H04.122: ICD-10-CM

## 2023-01-03 DIAGNOSIS — H02.135: ICD-10-CM

## 2023-01-03 DIAGNOSIS — D48.5: ICD-10-CM

## 2023-01-03 PROCEDURE — 99213 OFFICE O/P EST LOW 20 MIN: CPT | Performed by: OPHTHALMOLOGY

## 2023-01-03 PROCEDURE — 92285 EXTERNAL OCULAR PHOTOGRAPHY: CPT | Performed by: OPHTHALMOLOGY

## 2023-01-03 ASSESSMENT — PACHYMETRY
OD_CT_UM: 561
OS_CT_UM: 539
OD_CT_CORRECTION: -1
OS_CT_CORRECTION: 1

## 2023-01-03 ASSESSMENT — CONFRONTATIONAL VISUAL FIELD TEST (CVF)
OD_FINDINGS: FULL
OS_FINDINGS: FULL

## 2023-01-03 ASSESSMENT — SPHEQUIV_DERIVED
OD_SPHEQUIV: -0.625
OS_SPHEQUIV: -1.5

## 2023-01-03 ASSESSMENT — SUPERFICIAL PUNCTATE KERATITIS (SPK)
OS_SPK: T 1+
OD_SPK: T 1+

## 2023-01-03 ASSESSMENT — VISUAL ACUITY
OD_BCVA: 20/40+1
OS_BCVA: 20/30-1

## 2023-01-03 ASSESSMENT — KERATOMETRY
OS_K2POWER_DIOPTERS: 45.25
METHOD_AUTO_MANUAL: AUTO
OD_K1POWER_DIOPTERS: 45.50
OS_K1POWER_DIOPTERS: 44.75
OD_K2POWER_DIOPTERS: 45.75
OS_AXISANGLE_DEGREES: 012
OD_AXISANGLE_DEGREES: 135

## 2023-01-03 ASSESSMENT — REFRACTION_AUTOREFRACTION
OD_SPHERE: -0.50
OD_AXIS: 077
OD_CYLINDER: -0.25
OS_SPHERE: -1.00
OS_CYLINDER: -1.00
OS_AXIS: 082

## 2023-01-03 ASSESSMENT — TONOMETRY
OD_IOP_MMHG: 17
OS_IOP_MMHG: 19

## 2023-01-03 ASSESSMENT — LID EXAM ASSESSMENTS
OS_EDEMA: LUL 1+ 2+
OS_COMMENTS: WOUND DEHISCENCE /SCAR TISSUE AT SURGICAL SITE LUL

## 2023-01-03 ASSESSMENT — AXIALLENGTH_DERIVED
OD_AL: 23.0668
OS_AL: 23.6254

## 2023-01-17 ENCOUNTER — APPOINTMENT (OUTPATIENT)
Dept: ORTHOPEDIC SURGERY | Facility: CLINIC | Age: 73
End: 2023-01-17
Payer: MEDICARE

## 2023-01-17 VITALS
BODY MASS INDEX: 33.32 KG/M2 | WEIGHT: 200 LBS | SYSTOLIC BLOOD PRESSURE: 165 MMHG | DIASTOLIC BLOOD PRESSURE: 99 MMHG | HEART RATE: 88 BPM | HEIGHT: 65 IN

## 2023-01-17 DIAGNOSIS — M17.11 UNILATERAL PRIMARY OSTEOARTHRITIS, RIGHT KNEE: ICD-10-CM

## 2023-01-17 DIAGNOSIS — G56.80 OTHER SPECIFIED MONONEUROPATHIES OF UNSPECIFIED UPPER LIMB: ICD-10-CM

## 2023-01-17 DIAGNOSIS — M16.0 BILATERAL PRIMARY OSTEOARTHRITIS OF HIP: ICD-10-CM

## 2023-01-17 DIAGNOSIS — K22.10 ULCER OF ESOPHAGUS W/OUT BLEEDING: ICD-10-CM

## 2023-01-17 DIAGNOSIS — M70.61 TROCHANTERIC BURSITIS, RIGHT HIP: ICD-10-CM

## 2023-01-17 DIAGNOSIS — M17.12 UNILATERAL PRIMARY OSTEOARTHRITIS, LEFT KNEE: ICD-10-CM

## 2023-01-17 DIAGNOSIS — M70.62 TROCHANTERIC BURSITIS, RIGHT HIP: ICD-10-CM

## 2023-01-17 PROCEDURE — 72100 X-RAY EXAM L-S SPINE 2/3 VWS: CPT

## 2023-01-17 PROCEDURE — 99203 OFFICE O/P NEW LOW 30 MIN: CPT

## 2023-01-17 NOTE — REASON FOR VISIT
[Initial Visit] : an initial visit for [Back Pain] : back pain [Spouse] : spouse [[id number]] : [place]

## 2023-01-17 NOTE — DISCUSSION/SUMMARY
[Medication Risks Reviewed] : Medication risks reviewed [6 Weeks] : in 6 weeks [de-identified] : 45 minutes was spent reviewing the x-rays as well as discussing with the patient their clinical presentation, diagnosis and providing education.  Conservative treatment was discussed with the patient at length. Anticipatory guidance regarding disease process lumbar spondylosis, bilateral hip DJD, bilateral greater trochanteric bursitis and gluteus tendinopathy, bilateral knee DJD, scapulothoracic syndrome, avoidance of acute exacerbation this was discussed at length and all patients commenting concerns were answered to the patient's satisfaction. Physical therapy for decrease pain and increase function was ordered. Patient was given home exercises as approved by [employer] and works well held directed toward this particular process. Intermittent use of acetaminophen 500 mg 2 tablets t.i.d. p.r.n. mild to moderate pain, white NSAIDs due to the history of erosive esophagitis.  If pain is not resolving considerably on next visit we will offer Medrol Dosepak.  Continue intermittent pain management injections.. Home exercise including stretching on a daily basis for 20-30 minutes was recommended. Heat, ice, topical were discussed as needed. The patient will followup in 6-8 weeks at which point in time if symptoms continue we will order MRI studies lumbar spine to guide treatment plan including possible injection therapy with pain management versus surgical option.  Patient states she does not want surgery so we will exhaust all conservative management.  I do agree that a 3 level lumbar fusion has low statistical chance of significantly controlling her pain which is very multifactorial.\par Patient states that she does have a knee surgeon that she is following for her bilateral knee pain.  I recommend that she discuss her bilateral hip pain, greater trochanteric pain etc. with her knee surgeon to look at her hips.  We can do an AP pelvis x-ray on next visit and refer to hip specialty here at [facility] if her concerns or not addressed by her current knee and hip specialist.  We can also recommend that she has greater trochanteric injections, gluteus injections, intra-articular hip injections through her pain management physician if pain continues as well.

## 2023-01-17 NOTE — PHYSICAL EXAM
[de-identified] : CONSTITUTIONAL: The patient is a very pleasant individual who is well-nourished and who appears stated age.\par PSYCHIATRIC: The patient is alert and oriented X 3 and in no apparent distress, and participates with orthopedic evaluation well.\par HEAD: Atraumatic and is nonsyndromic in appearance.\par EENT: No visible thyromegaly, EOMI.\par RESPIRATORY: Respiratory rate is regular, not dyspneic on examination.\par LYMPHATICS: There is no inguinal lymphadenopathy\par INTEGUMENTARY: Skin is clean, dry, and intact about the bilateral lower extremities and lumbar spine.\par VASCULAR: There is brisk capillary refill about the bilateral lower extremities.\par NEUROLOGIC: There are no pathologic reflexes. There is no decrease in sensation of the bilateral lower extremities on Wartenberg pinwheel examination. Deep tendon reflexes are well maintained at 2+/4 of the bilateral lower extremities and are symmetric..\par MUSCULOSKELETAL: There is no visible muscular atrophy. Manual motor strength is well maintained in the bilateral lower extremities. Range of motion of lumbar spine is well maintained with pain on flexion greater than 40 degrees, reproducible thoracolumbar myositis, scapulothoracic dysfunction type picture.. The patient ambulates in a non-myelopathic manner. Negative tension sign and straight leg raise bilaterally. Quad extension, ankle dorsiflexion, EHL, plantar flexion, and ankle eversion are well preserved. Normal secondary orthopaedic exam of bilateral ankles.  Positive crepitus on range of motion of the bilateral knees.  Positive pain on external rotation and flexion of the bilateral hips into the groin.  Positive findings of greater trochanteric bursitis and gluteus tendinopathy bilaterally. [de-identified] : X-ray of the lumbar spine AP lateral done today in the office on the date of January 17, 2023 demonstrates age-appropriate lumbar spondylosis in particular L3-L4 through L5-S1.  There is a generative levoscoliosis centered at the L3 vertebral body.

## 2023-01-17 NOTE — HISTORY OF PRESENT ILLNESS
[de-identified] : 72-year-old female is here complaining of multiple joint pains.  She is here for low back pain across her low back radiating upward.  She also has pain at the scapulothoracic borders bilaterally.  She is requesting massage.  She states that her back pain began in approximately 2013, it is no worse no better.  The pain is constant.  Its worse with activity and general better with rest, Tylenol and pain management injections.  She just had a pain management injection proximately 1 month ago states that her pain is well controlled at this time.  She states her legs always feel tired and states that she is unable to walk long distances due to low back pain and bilateral leg pain.  She also does have focal bilateral knee pain focal bilateral groin and buttock pain, focal lateral hip pain.  At worst pain rates 7 out of 10 at best pain rates 4 out of 10.  It is deep, sore already.\jenn Has medical surgical social family and allergy history was reviewed.  She is a non-smoker.

## 2023-01-23 ENCOUNTER — OFFICE (OUTPATIENT)
Dept: URBAN - METROPOLITAN AREA CLINIC 112 | Facility: CLINIC | Age: 73
Setting detail: OPHTHALMOLOGY
End: 2023-01-23
Payer: COMMERCIAL

## 2023-01-23 DIAGNOSIS — Z96.1: ICD-10-CM

## 2023-01-23 DIAGNOSIS — H40.033: ICD-10-CM

## 2023-01-23 DIAGNOSIS — H35.033: ICD-10-CM

## 2023-01-23 PROBLEM — H00.12 CHALAZION; RIGHT UPPER LID , RIGHT LOWER LID: Status: ACTIVE | Noted: 2023-01-03

## 2023-01-23 PROBLEM — H00.11 CHALAZION; RIGHT UPPER LID , RIGHT LOWER LID: Status: ACTIVE | Noted: 2023-01-03

## 2023-01-23 PROCEDURE — 92083 EXTENDED VISUAL FIELD XM: CPT | Performed by: OPHTHALMOLOGY

## 2023-01-23 PROCEDURE — 92014 COMPRE OPH EXAM EST PT 1/>: CPT | Performed by: OPHTHALMOLOGY

## 2023-01-23 PROCEDURE — 92250 FUNDUS PHOTOGRAPHY W/I&R: CPT | Performed by: OPHTHALMOLOGY

## 2023-01-23 ASSESSMENT — REFRACTION_AUTOREFRACTION
OD_CYLINDER: -0.50
OS_CYLINDER: -1.25
OD_AXIS: 098
OS_AXIS: 068
OD_SPHERE: 0.00
OS_SPHERE: -1.25

## 2023-01-23 ASSESSMENT — KERATOMETRY
OD_K1POWER_DIOPTERS: 44.75
OD_K2POWER_DIOPTERS: 45.50
OS_K1POWER_DIOPTERS: 45.00
OD_AXISANGLE_DEGREES: 172
METHOD_AUTO_MANUAL: AUTO
OS_K2POWER_DIOPTERS: 45.50
OS_AXISANGLE_DEGREES: 108

## 2023-01-23 ASSESSMENT — VISUAL ACUITY
OD_BCVA: 20/30-1
OS_BCVA: 20/25

## 2023-01-23 ASSESSMENT — TONOMETRY
OD_IOP_MMHG: 15
OS_IOP_MMHG: 15

## 2023-01-23 ASSESSMENT — AXIALLENGTH_DERIVED
OS_AL: 23.68
OD_AL: 23.1028

## 2023-01-23 ASSESSMENT — PACHYMETRY
OS_CT_CORRECTION: 1
OS_CT_UM: 539
OD_CT_UM: 561
OD_CT_CORRECTION: -1

## 2023-01-23 ASSESSMENT — SUPERFICIAL PUNCTATE KERATITIS (SPK)
OD_SPK: T 1+
OS_SPK: T 1+

## 2023-01-23 ASSESSMENT — CONFRONTATIONAL VISUAL FIELD TEST (CVF)
OD_FINDINGS: FULL
OS_FINDINGS: FULL

## 2023-01-23 ASSESSMENT — LID EXAM ASSESSMENTS
OS_COMMENTS: WOUND DEHISCENCE /SCAR TISSUE AT SURGICAL SITE LUL
OS_EDEMA: LUL 1+ 2+

## 2023-01-23 ASSESSMENT — SPHEQUIV_DERIVED
OD_SPHEQUIV: -0.25
OS_SPHEQUIV: -1.875

## 2023-03-14 DIAGNOSIS — M16.9 OSTEOARTHRITIS OF HIP, UNSPECIFIED: ICD-10-CM

## 2023-03-15 ENCOUNTER — TRANSCRIPTION ENCOUNTER (OUTPATIENT)
Age: 73
End: 2023-03-15

## 2023-03-16 ENCOUNTER — OUTPATIENT (OUTPATIENT)
Dept: OUTPATIENT SERVICES | Facility: HOSPITAL | Age: 73
LOS: 1 days | End: 2023-03-16
Payer: MEDICARE

## 2023-03-16 DIAGNOSIS — Z98.890 OTHER SPECIFIED POSTPROCEDURAL STATES: Chronic | ICD-10-CM

## 2023-03-16 DIAGNOSIS — Z90.710 ACQUIRED ABSENCE OF BOTH CERVIX AND UTERUS: Chronic | ICD-10-CM

## 2023-03-16 DIAGNOSIS — Z98.49 CATARACT EXTRACTION STATUS, UNSPECIFIED EYE: Chronic | ICD-10-CM

## 2023-03-16 DIAGNOSIS — M47.817 SPONDYLOSIS WITHOUT MYELOPATHY OR RADICULOPATHY, LUMBOSACRAL REGION: ICD-10-CM

## 2023-03-16 PROCEDURE — 76000 FLUOROSCOPY <1 HR PHYS/QHP: CPT

## 2023-03-16 PROCEDURE — 64494 INJ PARAVERT F JNT L/S 2 LEV: CPT | Mod: 50

## 2023-03-16 PROCEDURE — 64493 INJ PARAVERT F JNT L/S 1 LEV: CPT | Mod: 50

## 2023-03-31 ENCOUNTER — APPOINTMENT (OUTPATIENT)
Dept: ORTHOPEDIC SURGERY | Facility: CLINIC | Age: 73
End: 2023-03-31
Payer: MEDICARE

## 2023-03-31 VITALS
SYSTOLIC BLOOD PRESSURE: 153 MMHG | DIASTOLIC BLOOD PRESSURE: 101 MMHG | HEIGHT: 65 IN | BODY MASS INDEX: 33.32 KG/M2 | WEIGHT: 200 LBS | HEART RATE: 82 BPM

## 2023-03-31 DIAGNOSIS — M47.816 SPONDYLOSIS W/OUT MYELOPATHY OR RADICULOPATHY, LUMBAR REGION: ICD-10-CM

## 2023-03-31 PROCEDURE — 99214 OFFICE O/P EST MOD 30 MIN: CPT

## 2023-03-31 PROCEDURE — 72100 X-RAY EXAM L-S SPINE 2/3 VWS: CPT

## 2023-03-31 NOTE — PHYSICAL EXAM
[de-identified] : CONSTITUTIONAL: The patient is a very pleasant individual who is well-nourished and who appears stated age.\par PSYCHIATRIC: The patient is alert and oriented X 3 and in no apparent distress, and participates with orthopedic evaluation well.\par HEAD: Atraumatic and is nonsyndromic in appearance.\par EENT: No visible thyromegaly, EOMI.\par RESPIRATORY: Respiratory rate is regular, not dyspneic on examination.\par LYMPHATICS: There is no inguinal lymphadenopathy\par INTEGUMENTARY: Skin is clean, dry, and intact about the bilateral lower extremities and lumbar spine.\par VASCULAR: There is brisk capillary refill about the bilateral lower extremities.\par NEUROLOGIC: There are no pathologic reflexes. There is no decrease in sensation of the bilateral lower extremities on Wartenberg pinwheel examination. Deep tendon reflexes are well maintained at 2+/4 of the bilateral lower extremities and are symmetric..\par MUSCULOSKELETAL: There is no visible muscular atrophy. Manual motor strength is well maintained in the bilateral lower extremities. Range of motion of lumbar spine is well maintained. The patient ambulates in a non-myelopathic manner. Negative tension sign and straight leg raise bilaterally. Quad extension, ankle dorsiflexion, EHL, plantar flexion, and ankle eversion are well preserved. Normal secondary orthopaedic exam of bilateral hips, greater trochanteric area, knees and ankles, physical exam is consistent with neurogenic claudication [de-identified] : Lumbar x-rays been reviewed on today's date shows a subtle convexity to the left no acute osseous abnormality such as pedicle deficiencies endplate deficiencies 2 views lumbar spine reviewed on March 31, 2023

## 2023-03-31 NOTE — DISCUSSION/SUMMARY
[de-identified] : Aced upon failed conservative care and exam that appears to be consistent with neurogenic claudication and spinal stenosis I recommend a lumbar MRI patient is leaving on this Tuesday to [place] for a month I do not think we can orchestrate an MRI prior to Tuesday I simply told the patient when she returns from [facility] call the office and we will put in the prescription for a lumbar MRI to further evaluate the degree of spinal stenosis after failed conservative care/physical therapy.

## 2023-03-31 NOTE — HISTORY OF PRESENT ILLNESS
[10] : a maximum pain level of 10/10 [Bending] : worsened by bending [Lifting] : worsened by lifting [Rest] : relieved by rest [de-identified] : Patient presents in follow-up to diffuse spine complaints as well as multiple joint complaints, patient states physical therapy was mildly helpful in improving her back pain she is also suffering what appears to be neurogenic claudication she states she absolutely needs a shopping cart/leaning forward helps her leg pain discomfort and cramping.  Formal [facility]  is used for this physical exam etc.  She is traveling to [place] on April 4 and will return May 6 [Worsening] : worsening [Prolonged Standing] : worsened by prolonged standing [Standing] : worsened by standing [Ataxia] : no ataxia [Incontinence] : no incontinence [Loss of Dexterity] : good dexterity [Urinary Ret.] : no urinary retention

## 2023-06-04 ENCOUNTER — EMERGENCY (EMERGENCY)
Facility: HOSPITAL | Age: 73
LOS: 1 days | Discharge: HOME | End: 2023-06-04
Attending: STUDENT IN AN ORGANIZED HEALTH CARE EDUCATION/TRAINING PROGRAM
Payer: MEDICARE

## 2023-06-04 VITALS
HEART RATE: 82 BPM | RESPIRATION RATE: 20 BRPM | SYSTOLIC BLOOD PRESSURE: 138 MMHG | DIASTOLIC BLOOD PRESSURE: 87 MMHG | TEMPERATURE: 99 F | OXYGEN SATURATION: 98 %

## 2023-06-04 VITALS
HEIGHT: 65 IN | OXYGEN SATURATION: 95 % | TEMPERATURE: 98 F | HEART RATE: 124 BPM | DIASTOLIC BLOOD PRESSURE: 85 MMHG | SYSTOLIC BLOOD PRESSURE: 145 MMHG | WEIGHT: 205.91 LBS | RESPIRATION RATE: 20 BRPM

## 2023-06-04 DIAGNOSIS — Z98.890 OTHER SPECIFIED POSTPROCEDURAL STATES: Chronic | ICD-10-CM

## 2023-06-04 DIAGNOSIS — Z98.49 CATARACT EXTRACTION STATUS, UNSPECIFIED EYE: Chronic | ICD-10-CM

## 2023-06-04 DIAGNOSIS — Z90.710 ACQUIRED ABSENCE OF BOTH CERVIX AND UTERUS: Chronic | ICD-10-CM

## 2023-06-04 LAB
ALBUMIN SERPL ELPH-MCNC: 3.3 G/DL — SIGNIFICANT CHANGE UP (ref 3.3–5.2)
ALP SERPL-CCNC: 59 U/L — SIGNIFICANT CHANGE UP (ref 40–120)
ALT FLD-CCNC: 19 U/L — SIGNIFICANT CHANGE UP
ANION GAP SERPL CALC-SCNC: 12 MMOL/L — SIGNIFICANT CHANGE UP (ref 5–17)
APPEARANCE UR: CLEAR — SIGNIFICANT CHANGE UP
AST SERPL-CCNC: 22 U/L — SIGNIFICANT CHANGE UP
BACTERIA # UR AUTO: ABNORMAL
BASOPHILS # BLD AUTO: 0.05 K/UL — SIGNIFICANT CHANGE UP (ref 0–0.2)
BASOPHILS NFR BLD AUTO: 0.6 % — SIGNIFICANT CHANGE UP (ref 0–2)
BILIRUB SERPL-MCNC: 1 MG/DL — SIGNIFICANT CHANGE UP (ref 0.4–2)
BILIRUB UR-MCNC: NEGATIVE — SIGNIFICANT CHANGE UP
BUN SERPL-MCNC: 13.6 MG/DL — SIGNIFICANT CHANGE UP (ref 8–20)
CALCIUM SERPL-MCNC: 9 MG/DL — SIGNIFICANT CHANGE UP (ref 8.4–10.5)
CHLORIDE SERPL-SCNC: 103 MMOL/L — SIGNIFICANT CHANGE UP (ref 96–108)
CO2 SERPL-SCNC: 22 MMOL/L — SIGNIFICANT CHANGE UP (ref 22–29)
COLOR SPEC: YELLOW — SIGNIFICANT CHANGE UP
CREAT SERPL-MCNC: 0.98 MG/DL — SIGNIFICANT CHANGE UP (ref 0.5–1.3)
DIFF PNL FLD: NEGATIVE — SIGNIFICANT CHANGE UP
EGFR: 60 ML/MIN/1.73M2 — SIGNIFICANT CHANGE UP
EOSINOPHIL # BLD AUTO: 0.26 K/UL — SIGNIFICANT CHANGE UP (ref 0–0.5)
EOSINOPHIL NFR BLD AUTO: 2.9 % — SIGNIFICANT CHANGE UP (ref 0–6)
EPI CELLS # UR: ABNORMAL
GLUCOSE SERPL-MCNC: 149 MG/DL — HIGH (ref 70–99)
GLUCOSE UR QL: NEGATIVE MG/DL — SIGNIFICANT CHANGE UP
HCT VFR BLD CALC: 36.7 % — SIGNIFICANT CHANGE UP (ref 34.5–45)
HGB BLD-MCNC: 12.3 G/DL — SIGNIFICANT CHANGE UP (ref 11.5–15.5)
IMM GRANULOCYTES NFR BLD AUTO: 0.8 % — SIGNIFICANT CHANGE UP (ref 0–0.9)
KETONES UR-MCNC: NEGATIVE — SIGNIFICANT CHANGE UP
LEUKOCYTE ESTERASE UR-ACNC: ABNORMAL
LYMPHOCYTES # BLD AUTO: 1.62 K/UL — SIGNIFICANT CHANGE UP (ref 1–3.3)
LYMPHOCYTES # BLD AUTO: 18.2 % — SIGNIFICANT CHANGE UP (ref 13–44)
MAGNESIUM SERPL-MCNC: 1.5 MG/DL — LOW (ref 1.6–2.6)
MCHC RBC-ENTMCNC: 29.6 PG — SIGNIFICANT CHANGE UP (ref 27–34)
MCHC RBC-ENTMCNC: 33.5 GM/DL — SIGNIFICANT CHANGE UP (ref 32–36)
MCV RBC AUTO: 88.2 FL — SIGNIFICANT CHANGE UP (ref 80–100)
MONOCYTES # BLD AUTO: 0.84 K/UL — SIGNIFICANT CHANGE UP (ref 0–0.9)
MONOCYTES NFR BLD AUTO: 9.5 % — SIGNIFICANT CHANGE UP (ref 2–14)
NEUTROPHILS # BLD AUTO: 6.04 K/UL — SIGNIFICANT CHANGE UP (ref 1.8–7.4)
NEUTROPHILS NFR BLD AUTO: 68 % — SIGNIFICANT CHANGE UP (ref 43–77)
NITRITE UR-MCNC: NEGATIVE — SIGNIFICANT CHANGE UP
PH UR: 6.5 — SIGNIFICANT CHANGE UP (ref 5–8)
PLATELET # BLD AUTO: 244 K/UL — SIGNIFICANT CHANGE UP (ref 150–400)
POTASSIUM SERPL-MCNC: 3.9 MMOL/L — SIGNIFICANT CHANGE UP (ref 3.5–5.3)
POTASSIUM SERPL-SCNC: 3.9 MMOL/L — SIGNIFICANT CHANGE UP (ref 3.5–5.3)
PROT SERPL-MCNC: 6.9 G/DL — SIGNIFICANT CHANGE UP (ref 6.6–8.7)
PROT UR-MCNC: 30 MG/DL
RAPID RVP RESULT: SIGNIFICANT CHANGE UP
RBC # BLD: 4.16 M/UL — SIGNIFICANT CHANGE UP (ref 3.8–5.2)
RBC # FLD: 12.8 % — SIGNIFICANT CHANGE UP (ref 10.3–14.5)
RBC CASTS # UR COMP ASSIST: SIGNIFICANT CHANGE UP /HPF (ref 0–4)
SARS-COV-2 RNA SPEC QL NAA+PROBE: SIGNIFICANT CHANGE UP
SODIUM SERPL-SCNC: 137 MMOL/L — SIGNIFICANT CHANGE UP (ref 135–145)
SP GR SPEC: 1.01 — SIGNIFICANT CHANGE UP (ref 1.01–1.02)
TROPONIN T SERPL-MCNC: <0.01 NG/ML — SIGNIFICANT CHANGE UP (ref 0–0.06)
UROBILINOGEN FLD QL: NEGATIVE MG/DL — SIGNIFICANT CHANGE UP
WBC # BLD: 8.88 K/UL — SIGNIFICANT CHANGE UP (ref 3.8–10.5)
WBC # FLD AUTO: 8.88 K/UL — SIGNIFICANT CHANGE UP (ref 3.8–10.5)
WBC UR QL: ABNORMAL /HPF (ref 0–5)

## 2023-06-04 PROCEDURE — 84484 ASSAY OF TROPONIN QUANT: CPT

## 2023-06-04 PROCEDURE — 93005 ELECTROCARDIOGRAM TRACING: CPT

## 2023-06-04 PROCEDURE — 81001 URINALYSIS AUTO W/SCOPE: CPT

## 2023-06-04 PROCEDURE — 96374 THER/PROPH/DIAG INJ IV PUSH: CPT

## 2023-06-04 PROCEDURE — 36415 COLL VENOUS BLD VENIPUNCTURE: CPT

## 2023-06-04 PROCEDURE — 96375 TX/PRO/DX INJ NEW DRUG ADDON: CPT

## 2023-06-04 PROCEDURE — 99285 EMERGENCY DEPT VISIT HI MDM: CPT | Mod: 25

## 2023-06-04 PROCEDURE — 71045 X-RAY EXAM CHEST 1 VIEW: CPT

## 2023-06-04 PROCEDURE — 83735 ASSAY OF MAGNESIUM: CPT

## 2023-06-04 PROCEDURE — 85025 COMPLETE CBC W/AUTO DIFF WBC: CPT

## 2023-06-04 PROCEDURE — 93010 ELECTROCARDIOGRAM REPORT: CPT

## 2023-06-04 PROCEDURE — 87086 URINE CULTURE/COLONY COUNT: CPT

## 2023-06-04 PROCEDURE — 71045 X-RAY EXAM CHEST 1 VIEW: CPT | Mod: 26

## 2023-06-04 PROCEDURE — T1013: CPT

## 2023-06-04 PROCEDURE — 99285 EMERGENCY DEPT VISIT HI MDM: CPT

## 2023-06-04 PROCEDURE — 80053 COMPREHEN METABOLIC PANEL: CPT

## 2023-06-04 PROCEDURE — 0225U NFCT DS DNA&RNA 21 SARSCOV2: CPT

## 2023-06-04 RX ORDER — CEFPODOXIME PROXETIL 100 MG
1 TABLET ORAL
Qty: 7 | Refills: 0
Start: 2023-06-04 | End: 2023-06-10

## 2023-06-04 RX ORDER — SODIUM CHLORIDE 9 MG/ML
1000 INJECTION, SOLUTION INTRAVENOUS ONCE
Refills: 0 | Status: COMPLETED | OUTPATIENT
Start: 2023-06-04 | End: 2023-06-04

## 2023-06-04 RX ORDER — CEFTRIAXONE 500 MG/1
1000 INJECTION, POWDER, FOR SOLUTION INTRAMUSCULAR; INTRAVENOUS ONCE
Refills: 0 | Status: COMPLETED | OUTPATIENT
Start: 2023-06-04 | End: 2023-06-04

## 2023-06-04 RX ORDER — ACETAMINOPHEN 500 MG
650 TABLET ORAL ONCE
Refills: 0 | Status: COMPLETED | OUTPATIENT
Start: 2023-06-04 | End: 2023-06-04

## 2023-06-04 RX ORDER — MAGNESIUM SULFATE 500 MG/ML
1 VIAL (ML) INJECTION ONCE
Refills: 0 | Status: COMPLETED | OUTPATIENT
Start: 2023-06-04 | End: 2023-06-04

## 2023-06-04 RX ORDER — KETOROLAC TROMETHAMINE 30 MG/ML
30 SYRINGE (ML) INJECTION ONCE
Refills: 0 | Status: DISCONTINUED | OUTPATIENT
Start: 2023-06-04 | End: 2023-06-04

## 2023-06-04 RX ADMIN — Medication 30 MILLIGRAM(S): at 10:43

## 2023-06-04 RX ADMIN — Medication 100 GRAM(S): at 12:02

## 2023-06-04 RX ADMIN — CEFTRIAXONE 1000 MILLIGRAM(S): 500 INJECTION, POWDER, FOR SOLUTION INTRAMUSCULAR; INTRAVENOUS at 12:02

## 2023-06-04 RX ADMIN — SODIUM CHLORIDE 1000 MILLILITER(S): 9 INJECTION, SOLUTION INTRAVENOUS at 10:43

## 2023-06-04 RX ADMIN — Medication 650 MILLIGRAM(S): at 12:02

## 2023-06-04 NOTE — ED ADULT NURSE NOTE - OBJECTIVE STATEMENT
Assumed care of pt at 0940 Pt A&Ox4 c/o fever/ muscle pains/ headache/ flank pain/ ear pain and weakness for approx 5 days. Pt denies any PMH. Pt denies CP and SOB. RR Even and unlabored.

## 2023-06-04 NOTE — ED ADULT NURSE NOTE - NSICDXPASTMEDICALHX_GEN_ALL_CORE_FT
PAST MEDICAL HISTORY:  Anxiety and depression     Class 1 obesity with body mass index (BMI) of 31.0 to 31.9 in adult     GERD (gastroesophageal reflux disease)     HTN (hypertension)     Hyperlipidemia, unspecified hyperlipidemia type     Knee injury left    Language barrier Chilean speaking    Varicose veins

## 2023-06-04 NOTE — ED PROVIDER NOTE - PHYSICAL EXAMINATION
General: Well appearing in no acute distress, alert and cooperative  Head: Normocephalic, atraumatic  Eyes: PERRLA, no conjunctival injection, no scleral icterus, EOMI  ENMT: Atraumatic external nose and ears, moist mucous membranes, oropharynx clear, TM clear b/l   Neck: Soft and supple, full ROM without pain  Cardiac: tachycardic rate and regular rhythm, no murmurs, no LE edema.  Resp: Unlabored respiratory effort, lungs CTAB, speaking in full sentences, no wheezes  Abd: Soft, non-tender, non-distended  MSK: positive straight leg test left, chest wall ttp.   Skin: Warm and dry  Neuro: AO x 3, moves all extremities symmetrically, Motor strength 5/5 bilaterally UE and LE, sensation grossly intact

## 2023-06-04 NOTE — ED PROVIDER NOTE - ATTENDING CONTRIBUTION TO CARE
74yo female with pmh of HTN, HLD, GERD presents with subjective fevers for 5 days, dysuria, urinary frequency for a couple of days with body aches and dry cough. Pt was taking tylenol but ran out.   Const: Awake, alert and oriented. In no acute distress. Well appearing.  HEENT: NC/AT. Moist mucous membranes.  Eyes: No scleral icterus. EOMI.  Neck:. Soft and supple. Full ROM without pain.  Cardiac: Tachy and regular rhythm. +S1/S2. Peripheral pulses 2+ and symmetric. No LE edema.  Resp: Speaking in full sentences. No evidence of respiratory distress. No wheezes, rales or rhonchi.  Abd: Soft, non-tender, non-distended. Normal bowel sounds in all 4 quadrants. No guarding or rebound.  Back: Spine midline and non-tender. No CVAT.  Skin: No rashes, abrasions or lacerations.  Lymph: No cervical lymphadenopathy.  Neuro: A&Ox3, moving all extremities symmetrically, follows commands, motor shaniqua upper and lower ext 5/5, sensory symm and intact CN 2-12 grossly intact, no ataxia, no nystagmus, no dysmetria, no ddk, symm shaniqua, no pronator drift  pt with uti, abx, well appearing, follow up

## 2023-06-04 NOTE — ED PROVIDER NOTE - PATIENT PORTAL LINK FT
You can access the FollowMyHealth Patient Portal offered by [facility] by registering at the following website: [email or web address]. By joining [facility]’s FollowMyHealth portal, you will also be able to view your health information using other applications (apps) compatible with our system.

## 2023-06-04 NOTE — ED PROVIDER NOTE - CLINICAL SUMMARY MEDICAL DECISION MAKING FREE TEXT BOX
73y Female with     history of GERD, HTN, HLD presenting with fever x 5 days. Patient also reports shortness of breath, dry cough and painful urination x 2 days. Patient states she was taking Tylenol with relief but stopped 2 days ago due to running out. Patient also reports chest pain last night that resolved. Denies headache, abdominal pain, vomiting, diarrhea, dark stools, focal neurologic symptoms.    Differential diagnosis includes but not limited to ***. Overall presentation is consistent with ***. Low suspicion for ***. 73y Female with multiple days of fever, cough and urinary symptoms. Hemodynamically stable, neurovascularly intact, lungs clear, abdomen soft. Differential diagnosis includes but not limited to UTI, PNA, electrolyte derangement, URI. 73y Female with multiple days of fever, cough and urinary symptoms. Hemodynamically stable, neurovascularly intact, lungs clear, abdomen soft. Differential diagnosis includes but not limited to UTI, PNA, electrolyte derangement, URI. Labs and imaging independently reviewed and interpreted, treating for UTI. Antibiotics given, discharge with PO antibiotics and pcp followup.

## 2023-06-04 NOTE — ED PROVIDER NOTE - OBJECTIVE STATEMENT
73y Female with history of GERD, HTN, HLD presenting with fever x 5 days. Patient also reports shortness of breath, dry cough and painful urination x 2 days. Patient states she was taking Tylenol with relief but stopped 2 days ago due to running out. Patient also reports chest pain last night that resolved. Denies headache, abdominal pain, vomiting, diarrhea, dark stools, focal neurologic symptoms.

## 2023-06-04 NOTE — ED ADULT NURSE NOTE - NSFALLUNIVINTERV_ED_ALL_ED
Bed/Stretcher in lowest position, wheels locked, appropriate side rails in place/Call bell, personal items and telephone in reach/Instruct patient to call for assistance before getting out of bed/chair/stretcher/Non-slip footwear applied when patient is off stretcher/[place] to call system/Physically safe environment - no spills, clutter or unnecessary equipment/Purposeful proactive rounding/Room/bathroom lighting operational, light cord in reach

## 2023-06-04 NOTE — ED PROVIDER NOTE - PROGRESS NOTE DETAILS
stable on reassessment. Conversation had with patient regarding results of lab work and imaging. Patient agrees with plan for discharge at this time. Patient agrees to comply with follow up to primary care doctor. Return to ED precautions and discharge instructions discussed with patient with verbal understanding.

## 2023-06-04 NOTE — ED PROVIDER NOTE - NSFOLLOWUPINSTRUCTIONS_ED_ALL_ED_FT
Please follow-up with your primary care doctor.  Please call for an appointment in the next 48 hours but if you cannot follow-up with your primary care doctor please return to the Emergency Department for any urgent issues.    You were given a copy of the tests performed today.  Please bring the results with you and review them with your primary care doctor.    If you have any worsening of symptoms or any other concerns please return to the Emergency Department immediately.    Please continue taking your home medications as directed.    Urinary Tract Infection    A urinary tract infection (UTI) is an infection of any part of the urinary tract, which includes the kidneys, ureters, bladder, and urethra. Risk factors include ignoring your need to urinate, wiping back to front if female, being an uncircumcised male, and having diabetes or a weak immune system. Symptoms include frequent urination, pain or burning with urination, foul smelling urine, cloudy urine, pain in the lower abdomen, blood in the urine, and fever. If you were prescribed an antibiotic medicine, take it as told by your health care provider. Do not stop taking the antibiotic even if you start to feel better.    SEEK IMMEDIATE MEDICAL CARE IF YOU HAVE ANY OF THE FOLLOWING SYMPTOMS: severe back or abdominal pain, fever, inability to keep fluids or medicine down, dizziness/lightheadedness, or a change in mental status.

## 2023-06-04 NOTE — ED PROVIDER NOTE - NSICDXPASTMEDICALHX_GEN_ALL_CORE_FT
PAST MEDICAL HISTORY:  Anxiety and depression     Class 1 obesity with body mass index (BMI) of 31.0 to 31.9 in adult     GERD (gastroesophageal reflux disease)     HTN (hypertension)     Hyperlipidemia, unspecified hyperlipidemia type     Knee injury left    Language barrier Estonian speaking    Varicose veins

## 2023-06-05 LAB
CULTURE RESULTS: SIGNIFICANT CHANGE UP
SPECIMEN SOURCE: SIGNIFICANT CHANGE UP

## 2023-07-11 ENCOUNTER — OFFICE (OUTPATIENT)
Dept: URBAN - METROPOLITAN AREA CLINIC 112 | Facility: CLINIC | Age: 73
Setting detail: OPHTHALMOLOGY
End: 2023-07-11
Payer: COMMERCIAL

## 2023-07-11 DIAGNOSIS — H04.553: ICD-10-CM

## 2023-07-11 DIAGNOSIS — H02.135: ICD-10-CM

## 2023-07-11 DIAGNOSIS — D48.5: ICD-10-CM

## 2023-07-11 DIAGNOSIS — H02.132: ICD-10-CM

## 2023-07-11 DIAGNOSIS — H04.123: ICD-10-CM

## 2023-07-11 PROCEDURE — 68810 PROBE NASOLACRIMAL DUCT: CPT | Performed by: OPHTHALMOLOGY

## 2023-07-11 PROCEDURE — 92012 INTRM OPH EXAM EST PATIENT: CPT | Performed by: OPHTHALMOLOGY

## 2023-07-11 ASSESSMENT — KERATOMETRY
OS_K1POWER_DIOPTERS: 45.00
OS_AXISANGLE_DEGREES: 108
OD_K1POWER_DIOPTERS: 44.75
OS_K2POWER_DIOPTERS: 45.50
METHOD_AUTO_MANUAL: AUTO
OD_K2POWER_DIOPTERS: 45.50
OD_AXISANGLE_DEGREES: 172

## 2023-07-11 ASSESSMENT — TONOMETRY
OD_IOP_MMHG: 19
OS_IOP_MMHG: 15

## 2023-07-11 ASSESSMENT — AXIALLENGTH_DERIVED
OD_AL: 23.1028
OS_AL: 23.68

## 2023-07-11 ASSESSMENT — VISUAL ACUITY: OD_BCVA: 20/40+1

## 2023-07-11 ASSESSMENT — PACHYMETRY
OS_CT_CORRECTION: 1
OS_CT_UM: 539
OD_CT_CORRECTION: -1
OD_CT_UM: 561

## 2023-07-11 ASSESSMENT — REFRACTION_AUTOREFRACTION
OD_CYLINDER: -0.50
OS_SPHERE: -1.25
OD_AXIS: 098
OD_SPHERE: 0.00
OS_CYLINDER: -1.25
OS_AXIS: 068

## 2023-07-11 ASSESSMENT — CONFRONTATIONAL VISUAL FIELD TEST (CVF)
OD_FINDINGS: FULL
OS_FINDINGS: FULL

## 2023-07-11 ASSESSMENT — LID EXAM ASSESSMENTS
OS_EDEMA: LUL 1+ 2+
OS_COMMENTS: WOUND DEHISCENCE /SCAR TISSUE AT SURGICAL SITE LUL

## 2023-07-11 ASSESSMENT — SUPERFICIAL PUNCTATE KERATITIS (SPK)
OD_SPK: T 1+
OS_SPK: T 1+

## 2023-07-11 ASSESSMENT — SPHEQUIV_DERIVED
OD_SPHEQUIV: -0.25
OS_SPHEQUIV: -1.875

## 2023-07-17 ENCOUNTER — OFFICE (OUTPATIENT)
Dept: URBAN - METROPOLITAN AREA CLINIC 112 | Facility: CLINIC | Age: 73
Setting detail: OPHTHALMOLOGY
End: 2023-07-17
Payer: COMMERCIAL

## 2023-07-17 DIAGNOSIS — H40.033: ICD-10-CM

## 2023-07-17 DIAGNOSIS — Z96.1: ICD-10-CM

## 2023-07-17 DIAGNOSIS — H35.033: ICD-10-CM

## 2023-07-17 PROCEDURE — 92014 COMPRE OPH EXAM EST PT 1/>: CPT | Performed by: OPHTHALMOLOGY

## 2023-07-17 PROCEDURE — 92020 GONIOSCOPY: CPT | Performed by: OPHTHALMOLOGY

## 2023-07-17 PROCEDURE — 92133 CPTRZD OPH DX IMG PST SGM ON: CPT | Performed by: OPHTHALMOLOGY

## 2023-07-17 ASSESSMENT — AXIALLENGTH_DERIVED
OD_AL: 23.1055
OS_AL: 23.5822

## 2023-07-17 ASSESSMENT — CONFRONTATIONAL VISUAL FIELD TEST (CVF)
OS_FINDINGS: FULL
OD_FINDINGS: FULL

## 2023-07-17 ASSESSMENT — KERATOMETRY
OS_K2POWER_DIOPTERS: 45.25
OD_K2POWER_DIOPTERS: 45.50
OD_AXISANGLE_DEGREES: 020
OS_K1POWER_DIOPTERS: 45.25
OD_K1POWER_DIOPTERS: 45.00
METHOD_AUTO_MANUAL: AUTO
OS_AXISANGLE_DEGREES: 090

## 2023-07-17 ASSESSMENT — REFRACTION_AUTOREFRACTION
OD_AXIS: 122
OS_SPHERE: -1.00
OS_AXIS: 074
OS_CYLINDER: -1.25
OD_CYLINDER: -0.75
OD_SPHERE: 0.00

## 2023-07-17 ASSESSMENT — SUPERFICIAL PUNCTATE KERATITIS (SPK)
OS_SPK: T 1+
OD_SPK: T 1+

## 2023-07-17 ASSESSMENT — SPHEQUIV_DERIVED
OD_SPHEQUIV: -0.375
OS_SPHEQUIV: -1.625

## 2023-07-17 ASSESSMENT — PACHYMETRY
OS_CT_UM: 539
OD_CT_UM: 561
OD_CT_CORRECTION: -1
OS_CT_CORRECTION: 1

## 2023-07-17 ASSESSMENT — LID EXAM ASSESSMENTS
OS_COMMENTS: WOUND DEHISCENCE /SCAR TISSUE AT SURGICAL SITE LUL
OS_EDEMA: LUL 1+ 2+

## 2023-07-17 ASSESSMENT — TONOMETRY
OD_IOP_MMHG: 14
OS_IOP_MMHG: 14

## 2023-07-17 ASSESSMENT — VISUAL ACUITY
OD_BCVA: 20/30-1
OS_BCVA: 20/25

## 2023-09-23 ENCOUNTER — EMERGENCY (EMERGENCY)
Facility: HOSPITAL | Age: 73
LOS: 1 days | Discharge: HOME | End: 2023-09-23
Attending: EMERGENCY MEDICINE
Payer: MEDICARE

## 2023-09-23 VITALS
RESPIRATION RATE: 18 BRPM | OXYGEN SATURATION: 96 % | SYSTOLIC BLOOD PRESSURE: 151 MMHG | WEIGHT: 203.05 LBS | HEIGHT: 62 IN | TEMPERATURE: 98 F | DIASTOLIC BLOOD PRESSURE: 95 MMHG | HEART RATE: 109 BPM

## 2023-09-23 VITALS
TEMPERATURE: 98 F | SYSTOLIC BLOOD PRESSURE: 138 MMHG | DIASTOLIC BLOOD PRESSURE: 88 MMHG | OXYGEN SATURATION: 95 % | HEART RATE: 101 BPM | RESPIRATION RATE: 18 BRPM

## 2023-09-23 DIAGNOSIS — Z98.890 OTHER SPECIFIED POSTPROCEDURAL STATES: Chronic | ICD-10-CM

## 2023-09-23 DIAGNOSIS — Z98.49 CATARACT EXTRACTION STATUS, UNSPECIFIED EYE: Chronic | ICD-10-CM

## 2023-09-23 DIAGNOSIS — Z90.710 ACQUIRED ABSENCE OF BOTH CERVIX AND UTERUS: Chronic | ICD-10-CM

## 2023-09-23 LAB
ABO RH CONFIRMATION: SIGNIFICANT CHANGE UP
ALBUMIN SERPL ELPH-MCNC: 4 G/DL — SIGNIFICANT CHANGE UP (ref 3.3–5.2)
ALP SERPL-CCNC: 75 U/L — SIGNIFICANT CHANGE UP (ref 40–120)
ALT FLD-CCNC: 20 U/L — SIGNIFICANT CHANGE UP
ANION GAP SERPL CALC-SCNC: 14 MMOL/L — SIGNIFICANT CHANGE UP (ref 5–17)
APPEARANCE UR: CLEAR — SIGNIFICANT CHANGE UP
APTT BLD: 30.2 SEC — SIGNIFICANT CHANGE UP (ref 24.5–35.6)
AST SERPL-CCNC: 19 U/L — SIGNIFICANT CHANGE UP
BACTERIA # UR AUTO: NEGATIVE — SIGNIFICANT CHANGE UP
BASOPHILS # BLD AUTO: 0.01 K/UL — SIGNIFICANT CHANGE UP (ref 0–0.2)
BASOPHILS NFR BLD AUTO: 0.1 % — SIGNIFICANT CHANGE UP (ref 0–2)
BILIRUB SERPL-MCNC: 0.9 MG/DL — SIGNIFICANT CHANGE UP (ref 0.4–2)
BILIRUB UR-MCNC: NEGATIVE — SIGNIFICANT CHANGE UP
BLD GP AB SCN SERPL QL: SIGNIFICANT CHANGE UP
BUN SERPL-MCNC: 25.5 MG/DL — HIGH (ref 8–20)
CALCIUM SERPL-MCNC: 9.5 MG/DL — SIGNIFICANT CHANGE UP (ref 8.4–10.5)
CHLORIDE SERPL-SCNC: 100 MMOL/L — SIGNIFICANT CHANGE UP (ref 96–108)
CO2 SERPL-SCNC: 24 MMOL/L — SIGNIFICANT CHANGE UP (ref 22–29)
COLOR SPEC: YELLOW — SIGNIFICANT CHANGE UP
CREAT SERPL-MCNC: 1.21 MG/DL — SIGNIFICANT CHANGE UP (ref 0.5–1.3)
DIFF PNL FLD: NEGATIVE — SIGNIFICANT CHANGE UP
EGFR: 47 ML/MIN/1.73M2 — LOW
EOSINOPHIL # BLD AUTO: 0.03 K/UL — SIGNIFICANT CHANGE UP (ref 0–0.5)
EOSINOPHIL NFR BLD AUTO: 0.3 % — SIGNIFICANT CHANGE UP (ref 0–6)
EPI CELLS # UR: SIGNIFICANT CHANGE UP
GLUCOSE SERPL-MCNC: 95 MG/DL — SIGNIFICANT CHANGE UP (ref 70–99)
GLUCOSE UR QL: NEGATIVE MG/DL — SIGNIFICANT CHANGE UP
HCT VFR BLD CALC: 46.2 % — HIGH (ref 34.5–45)
HGB BLD-MCNC: 15 G/DL — SIGNIFICANT CHANGE UP (ref 11.5–15.5)
IMM GRANULOCYTES NFR BLD AUTO: 0.9 % — SIGNIFICANT CHANGE UP (ref 0–0.9)
INR BLD: 1.04 RATIO — SIGNIFICANT CHANGE UP (ref 0.85–1.18)
KETONES UR-MCNC: NEGATIVE — SIGNIFICANT CHANGE UP
LEUKOCYTE ESTERASE UR-ACNC: NEGATIVE — SIGNIFICANT CHANGE UP
LYMPHOCYTES # BLD AUTO: 18.7 % — SIGNIFICANT CHANGE UP (ref 13–44)
LYMPHOCYTES # BLD AUTO: 2.02 K/UL — SIGNIFICANT CHANGE UP (ref 1–3.3)
MCHC RBC-ENTMCNC: 28.1 PG — SIGNIFICANT CHANGE UP (ref 27–34)
MCHC RBC-ENTMCNC: 32.5 GM/DL — SIGNIFICANT CHANGE UP (ref 32–36)
MCV RBC AUTO: 86.7 FL — SIGNIFICANT CHANGE UP (ref 80–100)
MONOCYTES # BLD AUTO: 1.18 K/UL — HIGH (ref 0–0.9)
MONOCYTES NFR BLD AUTO: 10.9 % — SIGNIFICANT CHANGE UP (ref 2–14)
NEUTROPHILS # BLD AUTO: 7.49 K/UL — HIGH (ref 1.8–7.4)
NEUTROPHILS NFR BLD AUTO: 69.1 % — SIGNIFICANT CHANGE UP (ref 43–77)
NITRITE UR-MCNC: NEGATIVE — SIGNIFICANT CHANGE UP
PH UR: 6 — SIGNIFICANT CHANGE UP (ref 5–8)
PLATELET # BLD AUTO: 288 K/UL — SIGNIFICANT CHANGE UP (ref 150–400)
POTASSIUM SERPL-MCNC: 4.3 MMOL/L — SIGNIFICANT CHANGE UP (ref 3.5–5.3)
POTASSIUM SERPL-SCNC: 4.3 MMOL/L — SIGNIFICANT CHANGE UP (ref 3.5–5.3)
PROT SERPL-MCNC: 7.8 G/DL — SIGNIFICANT CHANGE UP (ref 6.6–8.7)
PROT UR-MCNC: 30 MG/DL
PROTHROM AB SERPL-ACNC: 11.5 SEC — SIGNIFICANT CHANGE UP (ref 9.5–13)
RAPID RVP RESULT: DETECTED
RBC # BLD: 5.33 M/UL — HIGH (ref 3.8–5.2)
RBC # FLD: 13.5 % — SIGNIFICANT CHANGE UP (ref 10.3–14.5)
RBC CASTS # UR COMP ASSIST: SIGNIFICANT CHANGE UP /HPF (ref 0–4)
SARS-COV-2 RNA SPEC QL NAA+PROBE: DETECTED
SODIUM SERPL-SCNC: 138 MMOL/L — SIGNIFICANT CHANGE UP (ref 135–145)
SP GR SPEC: 1.01 — SIGNIFICANT CHANGE UP (ref 1.01–1.02)
UROBILINOGEN FLD QL: NEGATIVE MG/DL — SIGNIFICANT CHANGE UP
WBC # BLD: 10.83 K/UL — HIGH (ref 3.8–10.5)
WBC # FLD AUTO: 10.83 K/UL — HIGH (ref 3.8–10.5)
WBC UR QL: SIGNIFICANT CHANGE UP /HPF (ref 0–5)

## 2023-09-23 PROCEDURE — 85025 COMPLETE CBC W/AUTO DIFF WBC: CPT

## 2023-09-23 PROCEDURE — T1013: CPT

## 2023-09-23 PROCEDURE — 74177 CT ABD & PELVIS W/CONTRAST: CPT | Mod: 26,MA

## 2023-09-23 PROCEDURE — 87086 URINE CULTURE/COLONY COUNT: CPT

## 2023-09-23 PROCEDURE — 85610 PROTHROMBIN TIME: CPT

## 2023-09-23 PROCEDURE — 80053 COMPREHEN METABOLIC PANEL: CPT

## 2023-09-23 PROCEDURE — 71045 X-RAY EXAM CHEST 1 VIEW: CPT

## 2023-09-23 PROCEDURE — 71045 X-RAY EXAM CHEST 1 VIEW: CPT | Mod: 26

## 2023-09-23 PROCEDURE — 0225U NFCT DS DNA&RNA 21 SARSCOV2: CPT

## 2023-09-23 PROCEDURE — 86850 RBC ANTIBODY SCREEN: CPT

## 2023-09-23 PROCEDURE — 86901 BLOOD TYPING SEROLOGIC RH(D): CPT

## 2023-09-23 PROCEDURE — 93010 ELECTROCARDIOGRAM REPORT: CPT

## 2023-09-23 PROCEDURE — 86900 BLOOD TYPING SEROLOGIC ABO: CPT

## 2023-09-23 PROCEDURE — 81001 URINALYSIS AUTO W/SCOPE: CPT

## 2023-09-23 PROCEDURE — 74177 CT ABD & PELVIS W/CONTRAST: CPT | Mod: MA

## 2023-09-23 PROCEDURE — 36415 COLL VENOUS BLD VENIPUNCTURE: CPT

## 2023-09-23 PROCEDURE — 99285 EMERGENCY DEPT VISIT HI MDM: CPT | Mod: 25

## 2023-09-23 PROCEDURE — 93005 ELECTROCARDIOGRAM TRACING: CPT

## 2023-09-23 PROCEDURE — 99285 EMERGENCY DEPT VISIT HI MDM: CPT

## 2023-09-23 PROCEDURE — 85730 THROMBOPLASTIN TIME PARTIAL: CPT

## 2023-09-23 RX ORDER — ACETAMINOPHEN 500 MG
650 TABLET ORAL ONCE
Refills: 0 | Status: COMPLETED | OUTPATIENT
Start: 2023-09-23 | End: 2023-09-23

## 2023-09-23 RX ADMIN — Medication 650 MILLIGRAM(S): at 07:10

## 2023-09-23 NOTE — ED PROVIDER NOTE - NS ED ROS FT
General: fever, chills  HEENT: Denies sore throat  Neck: Denies neck pain  Resp: Denies SOB  Cardiovascular: Denies palpitations, LE edema  GI: Denies nausea, vomiting, diarrhea, constipation, blood in stool  : Denies hematuria  MSK: back pain  Neuro: Denies HA, dizziness, numbness, weakness  Skin: Denies rashes.

## 2023-09-23 NOTE — ED ADULT TRIAGE NOTE - CHIEF COMPLAINT QUOTE
Pt. endorses flu-like symptoms x8 days. Endorses cough, fevers, chills and body aches. Pt. also endorses urinary sx x3 days. Endorses dysuria, urinary frequency, pain/burning w/ urination. +b/l flank pain and pelvic pain.

## 2023-09-23 NOTE — ED PROVIDER NOTE - NSFOLLOWUPINSTRUCTIONS_ED_ALL_ED_FT
- Usted tiene diverticulitis, por favor tome demian antibióticos según lo prescrito.  - Seguimiento con erazo médico de atención primaria y gastroenterólogo  - Por favor mantenga kathia dieta suave merle unos días con líquidos malcolm      La diverticulitis es la inflamación o infección de pequeñas bolsas en el colon que se marce cuando usted TIENE kathia condición llamada diverticulosis. Esta afección puede variar de leve a grave, lo que puede conducir a perforaciones u obstrucciones del colon. Los síntomas incluyen dolor abdominal, fiebre/escalofríos, náuseas, vómitos, diarrea, estreñimiento, o mayuri en demian heces. Si le recetaron un medicamento antibiótico, tómelo según lo indique erazo proveedor de atención médica. No deje de arpan el antibiótico aunque empiece a sentirse mejor.    BUSQUE ATENCIÓN MÉDICA INMEDIATA SI TIENE ALGUNO DE LOS SIGUIENTES SÍNTOMAS: Empeoramiento del dolor abdominal, fiebre jaimie, incapacidad para retener líquidos o medicamentos, heces negras o con mayuri, incapacidad para evacuar gases, aturdimiento/mareo, o un cambio en el estado mental.          -----------------------------------------------    - You have diverticulitis, please take your antibiotics as prescribed.   - Follow up with your primary care doctor and gastroenterologist  - Please keep a bland diet for a few days with clear liquids        Diverticulitis is inflammation or infection of small pouches in your colon that form when you HAVE a condition called diverticulosis. This condition can range from mild to severe potentially leading to perforation or obstructions of your colon. Symptoms include abdominal pain, fever/chills, nausea, vomiting, diarrhea, constipation, or blood in your stool. If you were prescribed an antibiotic medicine, take it as told by your health care provider. Do not stop taking the antibiotic even if you start to feel better.    SEEK IMMEDIATE MEDICAL CARE IF YOU HAVE ANY OF THE FOLLOWING SYMPTOMS: worsening abdominal pain, high fever, inability to hold down liquids or medication, black or bloody stools, inability to pass gas, lightheadedness/dizziness, or a change in mental status.

## 2023-09-23 NOTE — ED PROVIDER NOTE - CLINICAL SUMMARY MEDICAL DECISION MAKING FREE TEXT BOX
73y female w/ pmh of HTN, HLD presenting with 8 days of fever, cough, congestion and 3 days of dysuria with pelvic pain. Patient nontoxic appearing, tachycardic, otherwise stable vitals, normal work of breathing.     will check labs, UA, CXR

## 2023-09-23 NOTE — ED PROVIDER NOTE - PHYSICAL EXAMINATION
General: Awake, alert, lying in bed in NAD  HEENT: Normocephalic, atraumatic. No scleral icterus or conjunctival injection. EOMI. Moist mucous membranes. Oropharynx clear.   Neck:. Soft and supple.  Cardiac: tachycardic, Peripheral pulses 2+ and symmetric. No LE edema.  Resp: Lungs CTAB. No accessory muscle use  Abd: Soft, lower abdominal pain, non-distended. No guarding, rebound, or rigidity.  Back: Spine midline and non-tender.   Skin: No rashes, abrasions, or lacerations.  Neuro: AO x 4. Moves all extremities symmetrically. Motor strength and sensation grossly intact.  Psych: Appropriate mood and affect

## 2023-09-23 NOTE — ED ADULT NURSE NOTE - NSFALLUNIVINTERV_ED_ALL_ED
Bed/Stretcher in lowest position, wheels locked, appropriate side rails in place/Call bell, personal items and telephone in reach/Instruct patient to call for assistance before getting out of bed/chair/stretcher/Non-slip footwear applied when patient is off stretcher/[place] to call system/Physically safe environment - no spills, clutter or unnecessary equipment/Purposeful proactive rounding/Room/bathroom lighting operational, light cord in reach

## 2023-09-23 NOTE — ED PROVIDER NOTE - NSICDXFAMILYHX_GEN_ALL_CORE_FT
FAMILY HISTORY:  Sibling  Still living? Yes, Estimated age: 51-60  Family history of diabetes mellitus, Age at diagnosis: Age Unknown  Family history of hypertension, Age at diagnosis: Age Unknown    
No

## 2023-09-23 NOTE — ED ADULT NURSE NOTE - OBJECTIVE STATEMENT
Assumed care of pt at 0630. Pt A&Ox4 c/o flu-like symptoms, the pt states that she has a cough/fever/chills/abdominal pain/flank pain/body aches, the pt states that she has had urgency/frequency/burning and describes the event as worse than her usual UTI, pt denies N/V/CP, pt resting comfortably showing no signs of respiratory distress or pain, the pt is calm and cooperative

## 2023-09-23 NOTE — ED PROVIDER NOTE - OBJECTIVE STATEMENT
73y female w/ pmh of HTN, HLD presenting with 8 days of fever, cough, congestion. Described yellow sputum and chest pain with coughing. 3 days ago she also developed pelvic pain and burning with urination. She states she urinates in small amounts. she also has left lower back pain. She denies SOB, blood in her urine or stool, N/V/D, or constipation.

## 2023-09-23 NOTE — ED PROVIDER NOTE - PROGRESS NOTE DETAILS
Siomara Swan, DO: Patient resting comfortably, abd soft. Work up significant for simple diverticulitis. +COVID. Patient without respiratory distress, not hypoxic. Rx augmentin sent to pharmacy. Advised f/u with GI doctor and PCP. Stable for dc home.

## 2023-09-23 NOTE — ED PROVIDER NOTE - PATIENT PORTAL LINK FT
You can access the FollowMyHealth Patient Portal offered by [facility] by registering at the following website: [email or web address]. By joining [facility] FollowMyHealth portal, you will also be able to view your health information using other applications (apps) compatible with our system.

## 2023-09-23 NOTE — ED PROVIDER NOTE - NSICDXPASTMEDICALHX_GEN_ALL_CORE_FT
PAST MEDICAL HISTORY:  Anxiety and depression     Class 1 obesity with body mass index (BMI) of 31.0 to 31.9 in adult     GERD (gastroesophageal reflux disease)     HTN (hypertension)     Hyperlipidemia, unspecified hyperlipidemia type     Knee injury left    Language barrier [place] speaking    Varicose veins

## 2023-09-23 NOTE — ED PROVIDER NOTE - ATTENDING CONTRIBUTION TO CARE
73y female w/ pmh of HTN, HLD presenting with 8 days of fever, cough, congestion. Has suprapubic ttp on exam, ddx includes cystitis, pyelonephritis, viral syndrome, amongst other etiologies. Will check labs, rvp, consider CTAP given reporting diffuse abdominal discomfort though without obvious CVAT; follow up studies, reassess, dispo.

## 2023-09-25 LAB
CULTURE RESULTS: SIGNIFICANT CHANGE UP
SPECIMEN SOURCE: SIGNIFICANT CHANGE UP

## 2023-11-21 ENCOUNTER — OFFICE (OUTPATIENT)
Dept: URBAN - METROPOLITAN AREA CLINIC 94 | Facility: CLINIC | Age: 73
Setting detail: OPHTHALMOLOGY
End: 2023-11-21
Payer: COMMERCIAL

## 2023-11-21 DIAGNOSIS — H04.121: ICD-10-CM

## 2023-11-21 DIAGNOSIS — H04.123: ICD-10-CM

## 2023-11-21 DIAGNOSIS — H04.122: ICD-10-CM

## 2023-11-21 PROCEDURE — 92012 INTRM OPH EXAM EST PATIENT: CPT | Performed by: OPHTHALMOLOGY

## 2023-11-21 PROCEDURE — 83861 MICROFLUID ANALY TEARS: CPT | Mod: QW,LT | Performed by: OPHTHALMOLOGY

## 2023-11-21 PROCEDURE — 83861 MICROFLUID ANALY TEARS: CPT | Mod: QW,RT | Performed by: OPHTHALMOLOGY

## 2023-11-21 ASSESSMENT — CONFRONTATIONAL VISUAL FIELD TEST (CVF)
OS_FINDINGS: FULL
OD_FINDINGS: FULL

## 2023-11-21 ASSESSMENT — REFRACTION_AUTOREFRACTION
OS_CYLINDER: -1.25
OS_SPHERE: -1.00
OD_SPHERE: 0.00
OD_AXIS: 122
OS_AXIS: 074
OD_CYLINDER: -0.75

## 2023-11-21 ASSESSMENT — LID EXAM ASSESSMENTS
OS_EDEMA: LUL 1+ 2+
OS_COMMENTS: WOUND DEHISCENCE /SCAR TISSUE AT SURGICAL SITE LUL

## 2023-11-21 ASSESSMENT — SPHEQUIV_DERIVED
OD_SPHEQUIV: -0.375
OS_SPHEQUIV: -1.625

## 2023-11-21 ASSESSMENT — SUPERFICIAL PUNCTATE KERATITIS (SPK)
OS_SPK: T 1+
OD_SPK: T 1+

## 2024-01-15 ENCOUNTER — OFFICE (OUTPATIENT)
Dept: URBAN - METROPOLITAN AREA CLINIC 112 | Facility: CLINIC | Age: 74
Setting detail: OPHTHALMOLOGY
End: 2024-01-15
Payer: COMMERCIAL

## 2024-01-15 DIAGNOSIS — Z96.1: ICD-10-CM

## 2024-01-15 DIAGNOSIS — H35.033: ICD-10-CM

## 2024-01-15 DIAGNOSIS — H40.033: ICD-10-CM

## 2024-01-15 PROCEDURE — 92250 FUNDUS PHOTOGRAPHY W/I&R: CPT | Performed by: OPHTHALMOLOGY

## 2024-01-15 PROCEDURE — 92083 EXTENDED VISUAL FIELD XM: CPT | Performed by: OPHTHALMOLOGY

## 2024-01-15 PROCEDURE — 92014 COMPRE OPH EXAM EST PT 1/>: CPT | Performed by: OPHTHALMOLOGY

## 2024-01-15 ASSESSMENT — SUPERFICIAL PUNCTATE KERATITIS (SPK)
OD_SPK: T 1+
OS_SPK: T 1+

## 2024-01-15 ASSESSMENT — REFRACTION_AUTOREFRACTION
OD_CYLINDER: -0.75
OD_SPHERE: 0.00
OS_SPHERE: -1.00
OS_AXIS: 080
OD_AXIS: 116
OS_CYLINDER: -1.00

## 2024-01-15 ASSESSMENT — LID EXAM ASSESSMENTS
OS_EDEMA: LUL 1+ 2+
OS_COMMENTS: WOUND DEHISCENCE /SCAR TISSUE AT SURGICAL SITE LUL

## 2024-01-15 ASSESSMENT — CONFRONTATIONAL VISUAL FIELD TEST (CVF)
OS_FINDINGS: FULL
OD_FINDINGS: FULL

## 2024-01-15 ASSESSMENT — SPHEQUIV_DERIVED
OD_SPHEQUIV: -0.375
OS_SPHEQUIV: -1.5

## 2024-02-16 ENCOUNTER — APPOINTMENT (OUTPATIENT)
Dept: NEUROLOGY | Facility: CLINIC | Age: 74
End: 2024-02-16
Payer: MEDICARE

## 2024-02-16 VITALS — HEIGHT: 65 IN | WEIGHT: 198 LBS | BODY MASS INDEX: 32.99 KG/M2

## 2024-02-16 DIAGNOSIS — G44.89 OTHER HEADACHE SYNDROME: ICD-10-CM

## 2024-02-16 PROCEDURE — 99204 OFFICE O/P NEW MOD 45 MIN: CPT

## 2024-02-16 PROCEDURE — G2211 COMPLEX E/M VISIT ADD ON: CPT

## 2024-02-16 RX ORDER — TOPIRAMATE 25 MG/1
25 TABLET, FILM COATED ORAL
Qty: 90 | Refills: 1 | Status: ACTIVE | COMMUNITY
Start: 2024-02-16 | End: 1900-01-01

## 2024-02-16 RX ORDER — ASPIRIN 81 MG
81 TABLET, DELAYED RELEASE (ENTERIC COATED) ORAL DAILY
Refills: 0 | Status: COMPLETED | COMMUNITY
End: 2024-02-16

## 2024-02-16 RX ORDER — FLUOXETINE HYDROCHLORIDE 40 MG/1
40 CAPSULE ORAL
Qty: 30 | Refills: 0 | Status: COMPLETED | COMMUNITY
Start: 2021-09-24 | End: 2024-02-16

## 2024-02-16 RX ORDER — FAMOTIDINE 20 MG/1
20 TABLET, FILM COATED ORAL
Qty: 90 | Refills: 0 | Status: COMPLETED | COMMUNITY
Start: 2021-11-02 | End: 2024-02-16

## 2024-02-16 RX ORDER — POLYETHYLENE GLYCOL-3350 AND ELECTROLYTES WITH FLAVOR PACK 240; 5.84; 2.98; 6.72; 22.72 G/278.26G; G/278.26G; G/278.26G; G/278.26G; G/278.26G
240 POWDER, FOR SOLUTION ORAL
Qty: 1 | Refills: 0 | Status: COMPLETED | COMMUNITY
Start: 2021-11-22 | End: 2024-02-16

## 2024-02-16 RX ORDER — NITROFURANTOIN (MONOHYDRATE/MACROCRYSTALS) 25; 75 MG/1; MG/1
100 CAPSULE ORAL
Qty: 6 | Refills: 0 | Status: COMPLETED | COMMUNITY
Start: 2021-11-12 | End: 2024-02-16

## 2024-02-16 RX ORDER — ESTRADIOL 0.1 MG/G
0.1 CREAM VAGINAL
Qty: 42 | Refills: 0 | Status: COMPLETED | COMMUNITY
Start: 2021-11-12 | End: 2024-02-16

## 2024-02-16 NOTE — PHYSICAL EXAM
[General Appearance - Alert] : alert [Oriented To Time, Place, And Person] : oriented to person, place, and time [Affect] : the affect was normal [Memory Recent] : recent memory was not impaired [Memory Remote] : remote memory was not impaired [Aphasia] : no dysphasia/aphasia [Cranial Nerves Optic (II)] : visual acuity intact bilaterally,  visual fields full to confrontation, pupils equal round and reactive to light [Cranial Nerves Oculomotor (III)] : extraocular motion intact [Cranial Nerves Trigeminal (V)] : facial sensation intact symmetrically [Cranial Nerves Facial (VII)] : face symmetrical [Cranial Nerves Vestibulocochlear (VIII)] : hearing was intact bilaterally [Cranial Nerves Glossopharyngeal (IX)] : tongue and palate midline [Cranial Nerves Accessory (XI - Cranial And Spinal)] : head turning and shoulder shrug symmetric [Cranial Nerves Hypoglossal (XII)] : there was no tongue deviation with protrusion [Motor Tone] : muscle tone was normal in all four extremities [Motor Strength] : muscle strength was normal in all four extremities [Sensation Tactile Decrease] : light touch was intact [Sensation Vibration Decrease] : vibration was intact [Romberg's Sign] : Romberg's sign was negtive [Abnormal Walk] : normal gait [Coordination - Dysmetria Impaired Finger-to-Nose Bilateral] : not present [2+] : Patella right 2+ [1+] : Patella left 1+ [Plantar Reflex Right Only] : normal on the right [Plantar Reflex Left Only] : normal on the left [FreeTextEntry5] : There is no papilledema. [FreeTextEntry7] : There is some decreased pin sensation in the left leg. [Optic Disc Abnormality] : the optic disc were normal in size and color [FreeTextEntry1] : There is no tenderness over the temporal arteries.

## 2024-02-16 NOTE — DATA REVIEWED
[de-identified] : Brain MRI was performed on 10/6/17. Study was unremarkable.  [de-identified] : Sedimentation rate was 2\par  C-reactive protein was 0.9

## 2024-02-16 NOTE — HISTORY OF PRESENT ILLNESS
[FreeTextEntry1] : I saw this patient in the office today.  She had been seen previously in 2017 for headaches. She had reported that they had subsided.  She now reports that they have recurred. The are again daily.

## 2024-02-16 NOTE — ASSESSMENT
[FreeTextEntry1] : This is a 73-year-old woman who has now had a recurrence of headaches.  I will repeat a brain MRI.  I have explained that there are essentially 2 strategies for dealing with chronic headaches.  The first is abortive medication of which there are numerous over-the-counter preparations as well as prescription options.  The second strategy is prophylactic medications.  I have explained that these are medications which prevent headaches when taken on a regular basis.  I have explained that there are several medications which have been found to be preventative against headaches.  I have further explained that none of the medications have an immediate effect.  They must build up in the system over a few weeks.  Most people notice that within a few weeks on the medication, the headache intensity is diminishing.  Within a few more weeks most people begin to notice that the frequency is decreasing as well.  I have explained that the preventive medications were all originally used for other conditions but were also found to work against chronic headaches.  The patient seemed to understand my explanation.  I have suggested a trial of Topamax 25 mg to be taken at bedtime in an attempt to decrease the frequency and intensity of the headaches.  I have discussed the potential side effects of the medication with the patient and have advised the patient to call me should any new symptoms occur.  I will see her back in a few months.

## 2024-02-16 NOTE — CONSULT LETTER
[Dear  ___] : Dear  [unfilled], [Courtesy Letter:] : I had the pleasure of seeing your patient, [unfilled], in my office today. [Please see my note below.] : Please see my note below. [Consult Closing:] : Thank you very much for allowing me to participate in the care of this patient.  If you have any questions, please do not hesitate to contact me. [Sincerely,] : Sincerely, [FreeTextEntry3] : Morris Vilalnueva MD.

## 2024-02-28 ENCOUNTER — TRANSCRIPTION ENCOUNTER (OUTPATIENT)
Age: 74
End: 2024-02-28

## 2024-02-29 ENCOUNTER — OUTPATIENT (OUTPATIENT)
Dept: OUTPATIENT SERVICES | Facility: HOSPITAL | Age: 74
LOS: 1 days | End: 2024-02-29
Payer: MEDICARE

## 2024-02-29 DIAGNOSIS — Z98.890 OTHER SPECIFIED POSTPROCEDURAL STATES: Chronic | ICD-10-CM

## 2024-02-29 DIAGNOSIS — M46.1 SACROILIITIS, NOT ELSEWHERE CLASSIFIED: ICD-10-CM

## 2024-02-29 DIAGNOSIS — Z90.710 ACQUIRED ABSENCE OF BOTH CERVIX AND UTERUS: Chronic | ICD-10-CM

## 2024-02-29 DIAGNOSIS — Z98.49 CATARACT EXTRACTION STATUS, UNSPECIFIED EYE: Chronic | ICD-10-CM

## 2024-02-29 PROCEDURE — G0260: CPT | Mod: 50

## 2024-02-29 PROCEDURE — 76000 FLUOROSCOPY <1 HR PHYS/QHP: CPT

## 2024-03-04 NOTE — ASU PREOP CHECKLIST - VIA
Quality 226: Preventive Care And Screening: Tobacco Use: Screening And Cessation Intervention: Patient screened for tobacco use and is an ex/non-smoker Quality 130: Documentation Of Current Medications In The Medical Record: Current Medications Documented Detail Level: Detailed Quality 431: Preventive Care And Screening: Unhealthy Alcohol Use - Screening: Patient not identified as an unhealthy alcohol user when screened for unhealthy alcohol use using a systematic screening method Quality 110: Preventive Care And Screening: Influenza Immunization: Influenza Immunization Administered during Influenza season stretcher

## 2024-04-16 NOTE — ASU DISCHARGE PLAN (ADULT/PEDIATRIC). - NS AS DC DISCHARGE ACCOMPANY
Friendly Reminders:   Please arrive 15 minutes early to all future appointments. Patients that arrive after their scheduled appointment time will be rescheduled.  Patients are responsible to complete lab work before every office visit.  Patients that do not complete lab work at least 1 week prior to their office visit, will have their appointment rescheduled.  If you are unable to keep a scheduled appointment, please contact our office as soon as possible. Thank you for your understanding and cooperation, we truly appreciate it.    Important Phone Numbers:  [facility]  ?  [phone]  Scheduling Ultrasounds & CT Scans  ?  [phone]  Scheduling Anemia & Iron Infusions [place]  ?  [phone]  Scheduling Anemia & Iron Infusions [place]  ?  [phone]      
Family

## 2024-06-21 ENCOUNTER — APPOINTMENT (OUTPATIENT)
Dept: NEUROLOGY | Facility: CLINIC | Age: 74
End: 2024-06-21
